# Patient Record
Sex: FEMALE | Race: BLACK OR AFRICAN AMERICAN | Employment: UNEMPLOYED | URBAN - METROPOLITAN AREA
[De-identification: names, ages, dates, MRNs, and addresses within clinical notes are randomized per-mention and may not be internally consistent; named-entity substitution may affect disease eponyms.]

---

## 2017-10-12 ENCOUNTER — HOSPITAL ENCOUNTER (OUTPATIENT)
Age: 22
Setting detail: SPECIMEN
Discharge: HOME | End: 2017-10-12
Payer: MEDICAID

## 2020-11-25 ENCOUNTER — HOSPITAL ENCOUNTER (OUTPATIENT)
Age: 25
Setting detail: SPECIMEN
Discharge: HOME | End: 2020-11-25
Payer: MEDICARE

## 2020-11-25 ENCOUNTER — OFFICE VISIT (OUTPATIENT)
Dept: OBGYN CLINIC | Age: 25
End: 2020-11-25
Payer: MEDICARE

## 2020-11-25 VITALS
DIASTOLIC BLOOD PRESSURE: 70 MMHG | WEIGHT: 149 LBS | BODY MASS INDEX: 29.25 KG/M2 | SYSTOLIC BLOOD PRESSURE: 114 MMHG | TEMPERATURE: 99.9 F | HEIGHT: 60 IN

## 2020-11-25 PROBLEM — Z87.59 HISTORY OF GESTATIONAL HYPERTENSION: Status: ACTIVE | Noted: 2020-11-25

## 2020-11-25 PROBLEM — Z87.59 HISTORY OF GESTATIONAL HYPERTENSION: Status: RESOLVED | Noted: 2020-11-25 | Resolved: 2020-11-25

## 2020-11-25 PROBLEM — E73.9 LACTOSE INTOLERANCE: Status: ACTIVE | Noted: 2017-10-31

## 2020-11-25 PROBLEM — O28.3 ABNORMAL FETAL ULTRASOUND: Status: RESOLVED | Noted: 2017-10-31 | Resolved: 2020-11-25

## 2020-11-25 PROBLEM — O13.9 GESTATIONAL HYPERTENSION: Status: ACTIVE | Noted: 2018-04-26

## 2020-11-25 PROBLEM — O13.9 GESTATIONAL HYPERTENSION: Status: RESOLVED | Noted: 2018-04-26 | Resolved: 2020-11-25

## 2020-11-25 PROBLEM — O28.3 ABNORMAL FETAL ULTRASOUND: Status: ACTIVE | Noted: 2017-10-31

## 2020-11-25 LAB
DIRECT EXAM: NORMAL
Lab: NORMAL
SPECIMEN DESCRIPTION: NORMAL

## 2020-11-25 PROCEDURE — G8484 FLU IMMUNIZE NO ADMIN: HCPCS | Performed by: NURSE PRACTITIONER

## 2020-11-25 PROCEDURE — G8419 CALC BMI OUT NRM PARAM NOF/U: HCPCS | Performed by: NURSE PRACTITIONER

## 2020-11-25 PROCEDURE — 87510 GARDNER VAG DNA DIR PROBE: CPT

## 2020-11-25 PROCEDURE — 87480 CANDIDA DNA DIR PROBE: CPT

## 2020-11-25 PROCEDURE — G8427 DOCREV CUR MEDS BY ELIG CLIN: HCPCS | Performed by: NURSE PRACTITIONER

## 2020-11-25 PROCEDURE — 87660 TRICHOMONAS VAGIN DIR PROBE: CPT

## 2020-11-25 PROCEDURE — 4004F PT TOBACCO SCREEN RCVD TLK: CPT | Performed by: NURSE PRACTITIONER

## 2020-11-25 PROCEDURE — 87491 CHLMYD TRACH DNA AMP PROBE: CPT

## 2020-11-25 PROCEDURE — 87591 N.GONORRHOEAE DNA AMP PROB: CPT

## 2020-11-25 PROCEDURE — 99213 OFFICE O/P EST LOW 20 MIN: CPT | Performed by: NURSE PRACTITIONER

## 2020-11-25 NOTE — PROGRESS NOTES
Jules Kenney  2020    YOB: 1995          The patient was seen today. She is here regarding right labial swollen 10 days ago, resolved now. C/o vaginal irritation, discharge, itching about a week ago- also resolved. Her bowels are regular and she is voiding without difficulty. HPI:  Jules Kenney is a 31 y.o. female  right labial swelling, vaginal irritation       OB History    Para Term  AB Living   1 1 1 0 0 1   SAB TAB Ectopic Molar Multiple Live Births   0 0 0 0 0 1      # Outcome Date GA Lbr Wilber/2nd Weight Sex Delivery Anes PTL Lv   1 Term 18 38w0d / 01:00 5 lb 13.7 oz (2.655 kg) M Vag-Spont  N JEANNETTE      Name: Monica Limber: 9  Apgar5: 9       History reviewed. No pertinent past medical history. History reviewed. No pertinent surgical history. History reviewed. No pertinent family history. Social History     Socioeconomic History    Marital status: Single     Spouse name: Not on file    Number of children: Not on file    Years of education: Not on file    Highest education level: Not on file   Occupational History    Not on file   Social Needs    Financial resource strain: Not on file    Food insecurity     Worry: Not on file     Inability: Not on file    Transportation needs     Medical: Not on file     Non-medical: Not on file   Tobacco Use    Smoking status: Current Some Day Smoker    Smokeless tobacco: Never Used   Substance and Sexual Activity    Alcohol use:  Yes    Drug use: Never    Sexual activity: Yes     Partners: Male     Birth control/protection: Condom   Lifestyle    Physical activity     Days per week: Not on file     Minutes per session: Not on file    Stress: Not on file   Relationships    Social connections     Talks on phone: Not on file     Gets together: Not on file     Attends Judaism service: Not on file     Active member of club or organization: Not on file     Attends meetings of clubs or organizations: Not on file     Relationship status: Not on file    Intimate partner violence     Fear of current or ex partner: Not on file     Emotionally abused: Not on file     Physically abused: Not on file     Forced sexual activity: Not on file   Other Topics Concern    Not on file   Social History Narrative    Not on file         MEDICATIONS:  No current outpatient medications on file. No current facility-administered medications for this visit. ALLERGIES:  Allergies as of 11/25/2020    (No Known Allergies)         REVIEW OF SYSTEMS:    yes   A minimum of an eleven point review of systems was completed. Review Of Systems (11 point):  Constitutional: No fever, chills or malaise; No weight change or fatigue  Head and Eyes: No vision, Headache, Dizziness or trauma in last 12 months  ENT ROS: No hearing, Tinnitis, sinus or taste problems  Hematological and Lymphatic ROS:No Lymphoma, Von Willebrand's, Hemophillia or Bleeding History  Psych ROS: No Depression, Homicidal thoughts,suicidal thoughts, or anxiety  Breast ROS: No prior breast abnormalities or lumps  Respiratory ROS: No SOB, Pneumoniae,Cough, or Pulmonary Embolism History  Cardiovascular ROS: No Chest Pain with Exertion, Palpitations, Syncope, Edema, Arrhythmia  Gastrointestinal ROS: No Indigestion, Heartburn, Nausea, vomiting, Diarrhea, Constipation,or Bowel Changes; No Bloody Stools or melena  Genito-Urinary ROS: No Dysuria, Hematuria or Nocturia. No Urinary Incontinence or Vaginal Discharge  Musculoskeletal ROS: No Arthralgia, Arthritis,Gout,Osteoporosis or Rheumatism  Neurological ROS: No CVA, Migraines, Epilepsy, Seizure Hx, or Limb Weakness  Dermatological ROS: No Rash, Itching, Hives, Mole Changes or Cancer          Blood pressure 114/70, temperature 99.9 °F (37.7 °C), height 5' (1.524 m), weight 149 lb (67.6 kg), last menstrual period 10/01/2020, not currently breastfeeding.     Chaperone for Intimate Exam   Chaperone was offered and accepted as part of the rooming process.  Chaperone: Monica BLEDSOE         Abdomen: Soft non-tender; good bowel sounds. No guarding, rebound or rigidity. No CVA tenderness bilaterally. Extremities: No calf tenderness, DTR 2/4, and No edema bilaterally    Pelvic: Vulva and vagina appear normal. Bimanual exam reveals normal uterus and adnexa. No labial swelling noted. No redness, discharge noted. Diagnostics:  No results found. Lab Results:  No results found for this or any previous visit. Assessment:   Diagnosis Orders   1. Acute vaginitis  VAGINITIS DNA PROBE    C.trachomatis N.gonorrhoeae DNA   2. History of gestational hypertension       Patient Active Problem List    Diagnosis Date Noted    History of gestational hypertension 11/25/2020    Lactose intolerance 10/31/2017         PLAN:  Return in about 4 weeks (around 12/23/2020) for annual.  Vaginal cultures collected  Repeat Annual every 1 year  Cervical Cytology Evaluation begins at 24years old. If Negative Cytology, Follow-up screening per current guidelines. Return to the office in 4 weeks. Counseled on preventative health maintenance follow-up. Orders Placed This Encounter   Procedures    VAGINITIS DNA PROBE     Standing Status:   Future     Standing Expiration Date:   11/25/2021    C.trachomatis N.gonorrhoeae DNA     Standing Status:   Future     Standing Expiration Date:   11/25/2021       Patient was seen with total face to face time of 15 minutes. More than 50% of this visit was counseling and education regarding The primary encounter diagnosis was Acute vaginitis. A diagnosis of History of gestational hypertension was also pertinent to this visit. and Established New Doctor and Vaginal Discharge   as well as  counseling on preventative health maintenance follow-up.

## 2020-11-27 LAB
C TRACH DNA GENITAL QL NAA+PROBE: NEGATIVE
N. GONORRHOEAE DNA: NEGATIVE
SPECIMEN DESCRIPTION: NORMAL

## 2021-08-16 ENCOUNTER — OFFICE VISIT (OUTPATIENT)
Dept: OBGYN CLINIC | Age: 26
End: 2021-08-16
Payer: MEDICARE

## 2021-08-16 ENCOUNTER — HOSPITAL ENCOUNTER (OUTPATIENT)
Age: 26
Setting detail: SPECIMEN
Discharge: HOME | End: 2021-08-16
Payer: MEDICARE

## 2021-08-16 VITALS
HEIGHT: 60 IN | WEIGHT: 151 LBS | DIASTOLIC BLOOD PRESSURE: 70 MMHG | BODY MASS INDEX: 29.64 KG/M2 | SYSTOLIC BLOOD PRESSURE: 114 MMHG

## 2021-08-16 DIAGNOSIS — N89.8 VAGINAL ODOR: ICD-10-CM

## 2021-08-16 DIAGNOSIS — N89.8 VAGINAL DISCHARGE: Primary | ICD-10-CM

## 2021-08-16 DIAGNOSIS — R30.0 DYSURIA: ICD-10-CM

## 2021-08-16 PROCEDURE — 4004F PT TOBACCO SCREEN RCVD TLK: CPT | Performed by: NURSE PRACTITIONER

## 2021-08-16 PROCEDURE — G8428 CUR MEDS NOT DOCUMENT: HCPCS | Performed by: NURSE PRACTITIONER

## 2021-08-16 PROCEDURE — 99213 OFFICE O/P EST LOW 20 MIN: CPT | Performed by: NURSE PRACTITIONER

## 2021-08-16 PROCEDURE — G8419 CALC BMI OUT NRM PARAM NOF/U: HCPCS | Performed by: NURSE PRACTITIONER

## 2021-08-16 NOTE — PROGRESS NOTES
Bairon Quezada  2021    YOB: 1995          The patient was seen today. She is here regarding vaginal discharge/odor/ burning x 3-4 weeks. Denies new partner . Her bowels are regular and she is voiding without difficulty. HPI:  Bairon Quezada is a 31 y.o. female  vaginal discharge/ odor/itching      OB History    Para Term  AB Living   1 1 1 0 0 1   SAB TAB Ectopic Molar Multiple Live Births   0 0 0 0 0 1      # Outcome Date GA Lbr Wilber/2nd Weight Sex Delivery Anes PTL Lv   1 Term 18 38w0d / 01:00 5 lb 13.7 oz (2.655 kg) M Vag-Spont  N JEANNETTE      Name: Selina Ovalles: 9  Apgar5: 9       History reviewed. No pertinent past medical history. History reviewed. No pertinent surgical history. History reviewed. No pertinent family history. Social History     Socioeconomic History    Marital status: Single     Spouse name: Not on file    Number of children: Not on file    Years of education: Not on file    Highest education level: Not on file   Occupational History    Not on file   Tobacco Use    Smoking status: Current Some Day Smoker    Smokeless tobacco: Never Used   Substance and Sexual Activity    Alcohol use: Yes    Drug use: Never    Sexual activity: Yes     Partners: Male     Birth control/protection: Condom   Other Topics Concern    Not on file   Social History Narrative    Not on file     Social Determinants of Health     Financial Resource Strain:     Difficulty of Paying Living Expenses:    Food Insecurity:     Worried About Running Out of Food in the Last Year:     [address] in the Last Year:    Transportation Needs:     Lack of Transportation (Medical):      Lack of Transportation (Non-Medical):    Physical Activity:     Days of Exercise per Week:     Minutes of Exercise per Session:    Stress:     Feeling of Stress :    Social Connections:     Frequency of Communication with Friends and Family:     Frequency of Social Gatherings with Friends and Family:     Attends [employer]ist Services:     Active Member of Clubs or Organizations:     Attends Club or Organization Meetings:     Marital Status:    Intimate Partner Violence:     Fear of Current or Ex-Partner:     Emotionally Abused:     Physically Abused:     Sexually Abused:          MEDICATIONS:  No current outpatient medications on file. No current facility-administered medications for this visit. ALLERGIES:  Allergies as of 08/16/2021    (No Known Allergies)         REVIEW OF SYSTEMS:    yes   A minimum of an eleven point review of systems was completed. Review Of Systems (11 point):  Constitutional: No fever, chills or malaise; No weight change or fatigue  Head and Eyes: No vision, Headache, Dizziness or trauma in last 12 months  ENT ROS: No hearing, Tinnitis, sinus or taste problems  Hematological and Lymphatic ROS:No Lymphoma, Von Willebrand's, Hemophillia or Bleeding History  Psych ROS: No Depression, Homicidal thoughts,suicidal thoughts, or anxiety  Breast ROS: No prior breast abnormalities or lumps  Respiratory ROS: No SOB, Pneumoniae,Cough, or Pulmonary Embolism History  Cardiovascular ROS: No Chest Pain with Exertion, Palpitations, Syncope, Edema, Arrhythmia  Gastrointestinal ROS: No Indigestion, Heartburn, Nausea, vomiting, Diarrhea, Constipation,or Bowel Changes; No Bloody Stools or melena  Genito-Urinary ROS: No Dysuria, Hematuria or Nocturia. No Urinary Incontinence or Vaginal Discharge  Musculoskeletal ROS: No Arthralgia, Arthritis,Gout,Osteoporosis or Rheumatism  Neurological ROS: No CVA, Migraines, Epilepsy, Seizure Hx, or Limb Weakness  Dermatological ROS: No Rash, Itching, Hives, Mole Changes or Cancer          Blood pressure 114/70, height 5' (1.524 m), weight 151 lb (68.5 kg), last menstrual period 08/08/2021, not currently breastfeeding.     Chaperone for Intimate Exam   Chaperone was offered and accepted as part of the rooming process.  Chaperone: Monica MA         Abdomen: Soft non-tender; good bowel sounds. No guarding, rebound or rigidity. No CVA tenderness bilaterally. Extremities: No calf tenderness, DTR 2/4, and No edema bilaterally    Pelvic: Vulva and vagina appear normal. Bimanual exam reveals normal uterus and adnexa. Diagnostics:  No results found. Lab Results:  Results for orders placed or performed during the hospital encounter of 11/25/20   C.trachomatis N.gonorrhoeae DNA    Specimen: Cervix   Result Value Ref Range    Specimen Description . CERVIX     C. trachomatis DNA NEGATIVE NEGATIVE    N. gonorrhoeae DNA NEGATIVE NEGATIVE   VAGINITIS DNA PROBE    Specimen: Vaginal   Result Value Ref Range    Specimen Description . VAGINA     Special Requests NOT REPORTED     Direct Exam NEGATIVE for Gardnerella vaginalis     Direct Exam NEGATIVE for Trichomonas vaginalis     Direct Exam NEGATIVE for Candida sp. Direct Exam       Method of testing is a DNA probe intended for detection and identification of Candida species, Gardnerella vaginalis, and Trichomonas vaginalis nucleic acid in vaginal fluid specimens from patients with symptoms of vaginitis/vaginosis. Assessment:   Diagnosis Orders   1. Vaginal discharge  VAGINITIS DNA PROBE    C.trachomatis N.gonorrhoeae DNA   2. Vaginal odor  VAGINITIS DNA PROBE    C.trachomatis N.gonorrhoeae DNA    Urinalysis Reflex to Culture   3. Dysuria  Urinalysis Reflex to Culture     Patient Active Problem List    Diagnosis Date Noted    History of gestational hypertension 11/25/2020    Lactose intolerance 10/31/2017           PLAN:  Return in about 4 weeks (around 9/13/2021) for annual.  Vaginal cultures collected  Slip for UA given   Repeat Annual every 1 year  Cervical Cytology Evaluation begins at 24years old. If Negative Cytology, Follow-up screening per current guidelines. Return to the office in 4 weeks.   Counseled on preventative health maintenance follow-up. Orders Placed This Encounter   Procedures    VAGINITIS DNA PROBE     Standing Status:   Future     Standing Expiration Date:   8/16/2022    C.trachomatis N.gonorrhoeae DNA     Standing Status:   Future     Standing Expiration Date:   8/16/2022    Urinalysis Reflex to Culture     Standing Status:   Future     Standing Expiration Date:   8/16/2022     Order Specific Question:   SPECIFY(EX-CATH,MIDSTREAM,CYSTO,ETC)? Answer:   clean catch           The patient, Kindra Kelly is a 31 y.o. female, was seen with a total time spent of 15 minutes for the visit on this date of service by the E/M provider. The time component had both face to face and non face to face time spent in determining the total time component. Counseling and education regarding her diagnosis listed below and her options regarding those diagnoses were also included in determining her time component. Diagnosis Orders   1. Vaginal discharge  VAGINITIS DNA PROBE    C.trachomatis N.gonorrhoeae DNA   2. Vaginal odor  VAGINITIS DNA PROBE    C.trachomatis N.gonorrhoeae DNA    Urinalysis Reflex to Culture   3. Dysuria  Urinalysis Reflex to Culture        The patient had her preventative health maintenance recommendations and follow-up reviewed with her at the completion of her visit.

## 2021-08-17 DIAGNOSIS — N89.8 VAGINAL ODOR: ICD-10-CM

## 2021-08-17 DIAGNOSIS — N89.8 VAGINAL DISCHARGE: ICD-10-CM

## 2021-08-17 LAB
C TRACH DNA GENITAL QL NAA+PROBE: NEGATIVE
DIRECT EXAM: NORMAL
Lab: NORMAL
N. GONORRHOEAE DNA: NEGATIVE
SPECIMEN DESCRIPTION: NORMAL
SPECIMEN DESCRIPTION: NORMAL

## 2021-08-20 ENCOUNTER — TELEPHONE (OUTPATIENT)
Dept: OBGYN CLINIC | Age: 26
End: 2021-08-20

## 2021-08-20 NOTE — TELEPHONE ENCOUNTER
Patient was notified of normal culture results. Encouraged patient to complete urinalysis to rule out +UTI.

## 2022-01-14 ENCOUNTER — HOSPITAL ENCOUNTER (OUTPATIENT)
Age: 27
Setting detail: SPECIMEN
Discharge: HOME | End: 2022-01-14

## 2022-01-14 ENCOUNTER — OFFICE VISIT (OUTPATIENT)
Dept: OBGYN CLINIC | Age: 27
End: 2022-01-14
Payer: MEDICARE

## 2022-01-14 VITALS
SYSTOLIC BLOOD PRESSURE: 122 MMHG | HEIGHT: 60 IN | WEIGHT: 162 LBS | BODY MASS INDEX: 31.8 KG/M2 | DIASTOLIC BLOOD PRESSURE: 82 MMHG

## 2022-01-14 DIAGNOSIS — A63.0 GENITAL WARTS: ICD-10-CM

## 2022-01-14 DIAGNOSIS — N76.0 ACUTE VAGINITIS: ICD-10-CM

## 2022-01-14 DIAGNOSIS — Z01.419 ENCOUNTER FOR GYNECOLOGICAL EXAMINATION WITHOUT ABNORMAL FINDING: Primary | ICD-10-CM

## 2022-01-14 LAB
CANDIDA SPECIES, DNA PROBE: NEGATIVE
GARDNERELLA VAGINALIS, DNA PROBE: NEGATIVE
SOURCE: NORMAL
TRICHOMONAS VAGINALIS DNA: NEGATIVE

## 2022-01-14 PROCEDURE — G8484 FLU IMMUNIZE NO ADMIN: HCPCS | Performed by: NURSE PRACTITIONER

## 2022-01-14 PROCEDURE — 99395 PREV VISIT EST AGE 18-39: CPT | Performed by: NURSE PRACTITIONER

## 2022-01-14 NOTE — PROGRESS NOTES
History and Physical  [address], [address] [facility] [address]. [phone]   Fax [phone]  Bree Darnell  2022              31 y.o. Chief Complaint   Patient presents with    Gynecologic Exam       Patient's last menstrual period was 12/15/2021 (approximate). Primary Care Physician: No primary care provider on file. The patient was seen and examined. She is here for her annual exam. Vaginal irritation 2 weeks ago. Denies sx currently. Her bowels are regular. There are no voiding complaints. She denies any bloating. She denies vaginal discharge and was counseled on STD's and the need for barrier contraception. HPI : Bree Darnell is a 31 y.o. female     Annual exam   Vaginal irritation     no Bloating  no Early Satiety  no Unexplained weight change of more than 15 lbs  no  PMB  no  PCB  ________________________________________________________________________  OB History    Para Term  AB Living   1 1 1 0 0 1   SAB IAB Ectopic Molar Multiple Live Births   0 0 0 0 0 1      # Outcome Date GA Lbr Wilber/2nd Weight Sex Delivery Anes PTL Lv   1 Term 18 38w0d / 01:00 5 lb 13.7 oz (2.655 kg) M Vag-Spont  N JEANNETTE      Name: Rebeca Robchidi: 9  Apgar5: 9     History reviewed. No pertinent past medical history. History reviewed. No pertinent surgical history. History reviewed. No pertinent family history. Social History     Socioeconomic History    Marital status: Single     Spouse name: Not on file    Number of children: Not on file    Years of education: Not on file    Highest education level: Not on file   Occupational History    Not on file   Tobacco Use    Smoking status: Former Smoker    Smokeless tobacco: Never Used   Substance and Sexual Activity    Alcohol use:  Yes    Drug use: Never    Sexual activity: Yes     Partners: Male Birth control/protection: Condom   Other Topics Concern    Not on file   Social History Narrative    Not on file     Social Determinants of Health     Financial Resource Strain:     Difficulty of Paying Living Expenses: Not on file   Food Insecurity:     Worried About Running Out of Food in the Last Year: Not on file    Wen of Food in the Last Year: Not on file   Transportation Needs:     Lack of Transportation (Medical): Not on file    Lack of Transportation (Non-Medical): Not on file   Physical Activity:     Days of Exercise per Week: Not on file    Minutes of Exercise per Session: Not on file   Stress:     Feeling of Stress : Not on file   Social Connections:     Frequency of Communication with Friends and Family: Not on file    Frequency of Social Gatherings with Friends and Family: Not on file    Attends [employer] Services: Not on file    Active Member of [address] [place] [address] [employer] or Organizations: Not on file    Attends Club or Organization Meetings: Not on file    Marital Status: Not on file   Intimate Partner Violence:     Fear of Current or Ex-Partner: Not on file    Emotionally Abused: Not on file    Physically Abused: Not on file    Sexually Abused: Not on file   Housing Stability:     Unable to Pay for Housing in the Last Year: Not on file    Number of [place] in the Last Year: Not on file    Unstable Housing in the Last Year: Not on file       MEDICATIONS:  No current outpatient medications on file. No current facility-administered medications for this visit. ALLERGIES:  Allergies as of 01/14/2022    (No Known Allergies)       Symptoms of decreased mood absent  Symptoms of anhedonia absent    **If either question is answered in a  positive fashion then complete the PHQ9 Scoring Evaluation and make the appropriate referral**      Immunization status: stated as current, but no records available.       Gynecologic History:  Menarche: 14 yo  Menopause at [age] yo     Patient's last menstrual period was 12/15/2021 (approximate). Sexually Active: Yes    STD History: Yes genital warts      Permanent Sterilization: No   Reversible Birth Control: No        Hormone Replacement Exposure: No      Genetic Qualified Family History of Breast, Ovarian , Colon or Uterine Cancer: No     If YES see scanned worksheet. Preventative Health Testing:    Health Maintenance:  Health Maintenance Due   Topic Date Due    Hepatitis C screen  Never done    Varicella vaccine (1 of 2 - 2-dose childhood series) Never done    COVID-19 Vaccine (1) Never done    Pneumococcal 0-64 years Vaccine (1 of 2 - PPSV23) Never done    Depression Screen  Never done    HIV screen  Never done    DTaP/Tdap/Td vaccine (1 - Tdap) Never done    Pap smear  Never done    Flu vaccine (1) Never done       Date of Last Pap Smear: na  Abnormal Pap Smear History: na  Colposcopy History:   Date of Last Mammogram: na  Date of Last Colonoscopy:   Date of Last Bone Density:      ________________________________________________________________________        REVIEW OF SYSTEMS:    yes   A minimum of an eleven point review of systems was completed. Review Of Systems (11 point):  Constitutional: No fever, chills or malaise; No weight change or fatigue  Head and Eyes: No vision changes, Headache, Dizziness or trauma in last 12 months  ENT ROS: No hearing, Tinnitis, sinus or taste problems  Hematological and Lymphatic ROS:No Lymphoma, Von Willebrand's, Hemophillia or Bleeding History  Psych ROS: No Depression, Homicidal thoughts,suicidal thoughts, or anxiety  Breast ROS: No breast abnormalities or lumps  Respiratory ROS: No SOB, Pneumoniae,Cough, or Pulmonary Embolism   Cardiovascular ROS: No Chest Pain with Exertion, Palpitations, Syncope, Edema, Arrhythmia  Gastrointestinal ROS: No Indigestion, Heartburn, Nausea, vomiting, Diarrhea, Constipation,or Bowel Changes;  No Bloody Stools or melena  Genito-Urinary ROS: No Dysuria, Hematuria or Nocturia. No Urinary Incontinence or Vaginal Discharge  Musculoskeletal ROS: No Arthralgia, Arthritis,Gout,Osteoporosis or Rheumatism  Neurological ROS: No CVA, Migraines, Epilepsy, Seizure Hx, or Limb Weakness  Dermatological ROS: No Rash, Itching, Hives, Mole Changes or Cancer                                                                                                                                                                                                                                  PHYSICAL Exam:     Constitutional:  Vitals:    01/14/22 0932   BP: 122/82   Site: Left Upper Arm   Position: Sitting   Cuff Size: Medium Adult   Weight: 162 lb (73.5 kg)   Height: 5' (1.524 m)       Chaperone for Intimate Exam   Chaperone was offered and accepted as part of the rooming process.  Chaperone: Tory BLEDSOE          General Appearance: This  is a well Developed, well Nourished, well groomed female. Her BMI was reviewed. Nutritional decision making was discussed. Skin:  There was a Normal Inspection of the skin without rashes or lesions. There were no rashes. (Papular, Maculopapular, Hives, Pustular, Macular)     There were no lesions (Ulcers, Erythema, Abn. Appearing Nevi)            Lymphatic:  No Lymph Nodes were Palpable in the neck , axilla or groin.  0 # Of Lymph Nodes; Location ; Character [Normal]  [Shotty] [Tender] [Enlarged]     Neck and EENT:  The neck was supple. There were no masses   The thyroid was not enlarged and had no masses. Perrla, EOMI B/L, TMI B/L No Abnormalities. Throat inspected-No exudates or Masses, Nares Patent No Masses        Respiratory: The lungs were auscultated and found to be clear. There were no rales, rhonchi or wheezes. There was a good respiratory effort. Cardiovascular: The heart was in a regular rate and rhythm. . No S3 or S4. There was no murmur appreciated. Location, grade, and radiation are not applicable. Extremities:   The patients extremities were without calf tenderness, edema, or varicosities. There was full range of motion in all four extremities. Pulses in all four extremities were appreciated and are 2/4. Abdomen: The abdomen was soft and non-tender. There were good bowel sounds in all quadrants and there was no guarding, rebound or rigidity. On evaluation there was no evidence of hepatosplenomegaly and there was no costal vertebral nasreen tenderness bilaterally. No hernias were appreciated. Abdominal Scars: none    Psych: The patient had a normal Orientation to: Time, Place, Person, and Situation  There is no Mood / Affect changes    Breast:  (Chest)  normal appearance, no masses or tenderness  Self breast exams were reviewed in detail. Literature was given. Pelvic Exam:  External genitalia: normal general appearance  Urinary system: urethral meatus normal  Vaginal: normal mucosa without prolapse or lesions  Cervix: normal appearance  Adnexa: normal bimanual exam  Uterus: normal single, nontender      Rectal Exam:  exam declined by patient          Musculosk:  Normal Gait and station was noted. Digits were evaluated without abnormal findings. Range of motion, stability and strength were evaluated and found to be appropriate for the patients age. ASSESSMENT:      31 y.o. Annual   Diagnosis Orders   1. Encounter for gynecological examination without abnormal finding  PAP SMEAR   2. Acute vaginitis  VAGINITIS DNA PROBE    C.trachomatis N.gonorrhoeae DNA   3. Genital warts            Chief Complaint   Patient presents with    Gynecologic Exam          History reviewed. No pertinent past medical history. Patient Active Problem List    Diagnosis Date Noted    Genital warts 01/14/2022     Priority: High    History of gestational hypertension 11/25/2020    Lactose intolerance 10/31/2017          Hereditary Breast, Ovarian, Colon and Uterine Cancer screening Done.           Tobacco & Secondary smoke risks reviewed; instructed on cessation and avoidance      Counseling Completed:  Preventative Health Recommendations and Follow up. The patient was informed of the recommended preventative health recommendations. 1. Annuals every year; Cytology collections per prevailing guidelines. 2. Mammograms begin every year at 37 yo if no abnormalities are found and no family history. 3. Bone density studies every 2-3 years. Begin at 73 yo. If no fracture history or osteoporosis family history. (significant). 4. Colonoscopy begin at 40 yo. Repeat every ten years if negative and no family history. 5. Calcium of 8093-0483 mg/day in split dosing  6. Vitamin D 400-800 IU/day  7. All other preventative health recommendations will be managed by the patients Primary care physician. PLAN:  Return in about 1 year (around 1/14/2023) for annual.   Pap smear collected  Vaginal cultures collected   Repeat Annual every 1 year  Cervical Cytology Evaluation begins at 24years old. If Negative Cytology, Follow-up screening per current guidelines. Mammograms every 1 year. If 37 yo and last mammogram was negative. Calcium and Vitamin D dosing reviewed. Colonoscopy screening reviewed as well as onset for bone density testing. Birth control and barrier recommendations discussed. STD counseling and prevention reviewed. Gardisil counseling completed for all patients 10-37 yo. Routine health maintenance per patients PCP. Orders Placed This Encounter   Procedures    VAGINITIS DNA PROBE     Standing Status:   Future     Standing Expiration Date:   1/13/2023    C.trachomatis N.gonorrhoeae DNA     Standing Status:   Future     Standing Expiration Date:   1/13/2023    PAP SMEAR     Patient History:    No LMP recorded. OBGYN Status: Having periods  No past surgical history on file.       Social History    Tobacco Use      Smoking status: Current Some Day Smoker      Smokeless tobacco: Never Used       Standing Status:   Future Standing Expiration Date:   1/13/2023     Order Specific Question:   Collection Type     Answer: Thin Prep     Order Specific Question:   Prior Abnormal Pap Test     Answer:   No     Order Specific Question:   Screening or Diagnostic     Answer:   Screening     Order Specific Question:   HPV Requested? Answer:   Yes - If Abnormal Reflex HPV     Order Specific Question:   High Risk Patient     Answer:   N/A           The patient, Ct Deluna is a 31 y.o. female, was seen with a total time spent of 30 minutes for the visit on this date of service by the E/M provider. The time component had both face to face and non face to face time spent in determining the total time component. Counseling and education regarding her diagnosis listed below and her options regarding those diagnoses were also included in determining her time component. Diagnosis Orders   1. Encounter for gynecological examination without abnormal finding  PAP SMEAR   2. Acute vaginitis  VAGINITIS DNA PROBE    C.trachomatis N.gonorrhoeae DNA   3. Genital warts          The patient had her preventative health maintenance recommendations and follow-up reviewed with her at the completion of her visit.

## 2022-01-25 LAB — CYTOLOGY REPORT: NORMAL

## 2022-05-16 DIAGNOSIS — N92.6 MISSED PERIOD: Primary | ICD-10-CM

## 2022-05-24 ENCOUNTER — HOSPITAL ENCOUNTER (OUTPATIENT)
Age: 27
Setting detail: SPECIMEN
Discharge: HOME | End: 2022-05-24
Payer: MEDICARE

## 2022-05-24 DIAGNOSIS — N92.6 MISSED PERIOD: ICD-10-CM

## 2022-05-24 LAB — HCG QUANTITATIVE: ABNORMAL MIU/ML

## 2022-05-24 PROCEDURE — 84702 CHORIONIC GONADOTROPIN TEST: CPT

## 2022-05-24 PROCEDURE — 36415 COLL VENOUS BLD VENIPUNCTURE: CPT

## 2022-05-25 ENCOUNTER — TELEPHONE (OUTPATIENT)
Dept: OBGYN CLINIC | Age: 27
End: 2022-05-25

## 2022-05-25 NOTE — TELEPHONE ENCOUNTER
Per Kim De La Torre NP, pt notified of +quant; LMP 2/3/22. Pt prefers OTC gummies for PNV; she will purchase. Call sent to [facility] to schedule new OB intake/US.

## 2022-05-25 NOTE — TELEPHONE ENCOUNTER
----- Message from BISMARK Teague CNP sent at 5/25/2022  7:48 AM EDT -----  Please schedule for new OB intake/ ultrasound  Prenatal vitamin 1 PO QD with 12 refills.

## 2022-06-28 ENCOUNTER — PROCEDURE VISIT (OUTPATIENT)
Dept: OBGYN CLINIC | Age: 27
End: 2022-06-28
Payer: MEDICARE

## 2022-06-28 ENCOUNTER — ROUTINE PRENATAL (OUTPATIENT)
Dept: OBGYN CLINIC | Age: 27
End: 2022-06-28
Payer: MEDICARE

## 2022-06-28 ENCOUNTER — HOSPITAL ENCOUNTER (OUTPATIENT)
Age: 27
Setting detail: SPECIMEN
Discharge: HOME | End: 2022-06-28

## 2022-06-28 ENCOUNTER — HOSPITAL ENCOUNTER (OUTPATIENT)
Age: 27
Setting detail: SPECIMEN
Discharge: HOME | End: 2022-06-28
Payer: MEDICARE

## 2022-06-28 VITALS
BODY MASS INDEX: 31.05 KG/M2 | DIASTOLIC BLOOD PRESSURE: 72 MMHG | WEIGHT: 159 LBS | SYSTOLIC BLOOD PRESSURE: 114 MMHG | HEART RATE: 80 BPM

## 2022-06-28 DIAGNOSIS — O09.30 LATE PRENATAL CARE: Primary | ICD-10-CM

## 2022-06-28 DIAGNOSIS — O09.92 SUPERVISION OF HIGH RISK PREGNANCY IN SECOND TRIMESTER: ICD-10-CM

## 2022-06-28 DIAGNOSIS — O09.32 LATE PRENATAL CARE AFFECTING PREGNANCY IN SECOND TRIMESTER: ICD-10-CM

## 2022-06-28 DIAGNOSIS — Z3A.20 20 WEEKS GESTATION OF PREGNANCY: ICD-10-CM

## 2022-06-28 DIAGNOSIS — Z3A.19 19 WEEKS GESTATION OF PREGNANCY: Primary | ICD-10-CM

## 2022-06-28 DIAGNOSIS — O09.30 LATE PRENATAL CARE: ICD-10-CM

## 2022-06-28 DIAGNOSIS — Z87.59 HISTORY OF GESTATIONAL HYPERTENSION: ICD-10-CM

## 2022-06-28 DIAGNOSIS — Z3A.19 19 WEEKS GESTATION OF PREGNANCY: ICD-10-CM

## 2022-06-28 DIAGNOSIS — O36.80X0 PREGNANCY WITH INCONCLUSIVE FETAL VIABILITY, SINGLE OR UNSPECIFIED FETUS: Primary | ICD-10-CM

## 2022-06-28 DIAGNOSIS — Z87.59 H/O PRE-ECLAMPSIA: ICD-10-CM

## 2022-06-28 LAB
ABDOMINAL CIRCUMFERENCE: NORMAL
ABO/RH: NORMAL
ABSOLUTE EOS #: 0.2 K/UL (ref 0–0.4)
ABSOLUTE LYMPH #: 2.2 K/UL (ref 1–4.8)
ABSOLUTE MONO #: 0.7 K/UL (ref 0.1–1.3)
ANTIBODY SCREEN: NEGATIVE
BASOPHILS # BLD: 0 % (ref 0–2)
BASOPHILS ABSOLUTE: 0 K/UL (ref 0–0.2)
BIPARIETAL DIAMETER: NORMAL
CRL: NORMAL
EOSINOPHILS RELATIVE PERCENT: 2 % (ref 0–4)
ESTIMATED FETAL WEIGHT: NORMAL
FEMORAL DIAMETER: NORMAL
GLUCOSE BLD-MCNC: 85 MG/DL (ref 70–99)
HC/AC: NORMAL
HCT VFR BLD CALC: 34.8 % (ref 36–46)
HEAD CIRCUMFERENCE: NORMAL
HEMOGLOBIN: 11.2 G/DL (ref 12–16)
HEPATITIS B SURFACE ANTIGEN: NONREACTIVE
HIV AG/AB: NONREACTIVE
LYMPHOCYTES # BLD: 20 % (ref 24–44)
MCH RBC QN AUTO: 26.8 PG (ref 26–34)
MCHC RBC AUTO-ENTMCNC: 32.3 G/DL (ref 31–37)
MCV RBC AUTO: 82.9 FL (ref 80–100)
MONOCYTES # BLD: 7 % (ref 1–7)
PDW BLD-RTO: 15.2 % (ref 11.5–14.9)
PLATELET # BLD: 325 K/UL (ref 150–450)
PMV BLD AUTO: 8.1 FL (ref 6–12)
RBC # BLD: 4.19 M/UL (ref 4–5.2)
RUBV IGG SER QL: 418.5 IU/ML
SAC DIAMETER: NORMAL
SEG NEUTROPHILS: 71 % (ref 36–66)
SEGMENTED NEUTROPHILS ABSOLUTE COUNT: 8.2 K/UL (ref 1.3–9.1)
T. PALLIDUM, IGG: NONREACTIVE
TSH SERPL DL<=0.05 MIU/L-ACNC: 0.54 UIU/ML (ref 0.3–5)
WBC # BLD: 11.4 K/UL (ref 3.5–11)

## 2022-06-28 PROCEDURE — 87389 HIV-1 AG W/HIV-1&-2 AB AG IA: CPT

## 2022-06-28 PROCEDURE — 1036F TOBACCO NON-USER: CPT | Performed by: NURSE PRACTITIONER

## 2022-06-28 PROCEDURE — 85025 COMPLETE CBC W/AUTO DIFF WBC: CPT

## 2022-06-28 PROCEDURE — 86901 BLOOD TYPING SEROLOGIC RH(D): CPT

## 2022-06-28 PROCEDURE — 81220 CFTR GENE COM VARIANTS: CPT

## 2022-06-28 PROCEDURE — 86777 TOXOPLASMA ANTIBODY: CPT

## 2022-06-28 PROCEDURE — 86850 RBC ANTIBODY SCREEN: CPT

## 2022-06-28 PROCEDURE — 99214 OFFICE O/P EST MOD 30 MIN: CPT | Performed by: NURSE PRACTITIONER

## 2022-06-28 PROCEDURE — 86900 BLOOD TYPING SEROLOGIC ABO: CPT

## 2022-06-28 PROCEDURE — G8428 CUR MEDS NOT DOCUMENT: HCPCS | Performed by: NURSE PRACTITIONER

## 2022-06-28 PROCEDURE — 86780 TREPONEMA PALLIDUM: CPT

## 2022-06-28 PROCEDURE — 86778 TOXOPLASMA ANTIBODY IGM: CPT

## 2022-06-28 PROCEDURE — 36415 COLL VENOUS BLD VENIPUNCTURE: CPT

## 2022-06-28 PROCEDURE — 84443 ASSAY THYROID STIM HORMONE: CPT

## 2022-06-28 PROCEDURE — 87340 HEPATITIS B SURFACE AG IA: CPT

## 2022-06-28 PROCEDURE — G8417 CALC BMI ABV UP PARAM F/U: HCPCS | Performed by: NURSE PRACTITIONER

## 2022-06-28 PROCEDURE — 86762 RUBELLA ANTIBODY: CPT

## 2022-06-28 PROCEDURE — 82947 ASSAY GLUCOSE BLOOD QUANT: CPT

## 2022-06-28 PROCEDURE — 76805 OB US >/= 14 WKS SNGL FETUS: CPT | Performed by: OBSTETRICS & GYNECOLOGY

## 2022-06-28 RX ORDER — ASPIRIN 81 MG/1
81 TABLET ORAL DAILY
Qty: 90 TABLET | Refills: 1 | Status: SHIPPED | OUTPATIENT
Start: 2022-06-28

## 2022-06-28 NOTE — PROGRESS NOTES
Haris Azar  2022    YOB: 1995   Patient's last menstrual period was 12/15/2021 (approximate). 19w6d  Joshua Maldonado      Primary Care Physician: No primary care provider on file. CC: Initial Prenatal Visit    Subjective:     Haris Azar is a 28 y.o. female     is being seen today for her first obstetrical visit. This is a planned pregnancy. She is at 19w6d  Her obstetrical history is significant for h/o preeclampsia, late prenatal care. Relationship with FOB: significant other, living together. Patient undecided about breast feeding. Pregnancy history fully reviewed. Mother's ethnicity:   Father's ethnicity:       Objective:   Blood pressure 114/72, pulse 80, weight 159 lb (72.1 kg), last menstrual period 12/15/2021, not currently breastfeeding. OB History    Para Term  AB Living   2 1 1 0 0 1   SAB IAB Ectopic Molar Multiple Live Births   0 0 0 0 0 1      # Outcome Date GA Lbr Wilber/2nd Weight Sex Delivery Anes PTL Lv   2 Current            1 Term 18 38w0d / 01:00 5 lb 13.7 oz (2.655 kg) M Vag-Spont  N JEANNETTE      Name: Bettye Martinez: 9  Apgar5: 9       No past medical history on file. No past surgical history on file. Social History     Socioeconomic History    Marital status: Single     Spouse name: Not on file    Number of children: Not on file    Years of education: Not on file    Highest education level: Not on file   Occupational History    Not on file   Tobacco Use    Smoking status: Former Smoker    Smokeless tobacco: Never Used   Substance and Sexual Activity    Alcohol use:  Yes    Drug use: Never    Sexual activity: Yes     Partners: Male     Birth control/protection: Condom   Other Topics Concern    Not on file   Social History Narrative    Not on file     Social Determinants of Health     Financial Resource Strain:     Difficulty of Paying Living Expenses: Not on file Food Insecurity:     Worried About Running Out of Food in the Last Year: Not on file    Wen of Food in the Last Year: Not on file   Transportation Needs:     Lack of Transportation (Medical): Not on file    Lack of Transportation (Non-Medical): Not on file   Physical Activity:     Days of Exercise per Week: Not on file    Minutes of Exercise per Session: Not on file   Stress:     Feeling of Stress : Not on file   Social Connections:     Frequency of Communication with Friends and Family: Not on file    Frequency of Social Gatherings with Friends and Family: Not on file    Attends [employer] Services: Not on file    Active Member of [address] [place] [address] [employer] or Organizations: Not on file    Attends Club or Organization Meetings: Not on file    Marital Status: Not on file   Intimate Partner Violence:     Fear of Current or Ex-Partner: Not on file    Emotionally Abused: Not on file    Physically Abused: Not on file    Sexually Abused: Not on file   Housing Stability:     Unable to Pay for Housing in the Last Year: Not on file    Number of [place] in the Last Year: Not on file    Unstable Housing in the Last Year: Not on file     No family history on file. MEDICATIONS:  Current Outpatient Medications   Medication Sig Dispense Refill    Prenatal MV & Min w/FA-DHA (PRENATAL GUMMIES) 0.18-25 MG CHEW Take 1 tablet by mouth daily      aspirin EC 81 MG EC tablet Take 1 tablet by mouth daily 90 tablet 1     No current facility-administered medications for this visit. ALLERGIES:  Allergies as of 06/28/2022    (No Known Allergies)           Physical Exam Completed-See Epic Navigator    Chaperone for Intimate Exam   Chaperone was offered and accepted as part of the rooming process.    Chaperone: Fiorella               Assessment:      Pregnancy at 23 and 6/7 weeks    Diagnosis Orders   1. 19 weeks gestation of pregnancy  Culture, Genital    C.trachomatis N.gonorrhoeae DNA    Rubella antibody, IgG    Prenatal type and screen    RPR    Cystic Fibrosis    Glucose, Random    CBC with Auto Differential    TSH with Reflex    Hepatitis B Surface Antigen Obstetric Panel    Ambulatory referral to Maternal Fetal    HIV Screen    Toxoplasma Gondii Antibody, IgG    Toxoplasma Gondii Antibody, IgM    Urinalysis with Reflex to Culture   2. Late prenatal care affecting pregnancy in second trimester  Culture, Genital    C.trachomatis N.gonorrhoeae DNA   3. Late prenatal care     4. H/O pre-eclampsia             Patient Active Problem List    Diagnosis Date Noted    Late prenatal care 2022     Priority: High     Started at 23 weeks      H/O pre-eclampsia 2022     Priority: High    Genital warts 2022     Priority: High    History of gestational hypertension 2020    Lactose intolerance 10/31/2017                    Plan:      Initial labs drawn. Prenatal vitamins. RTO in 4 weeks. Vaginal cultures collected. [facility] 20 week anatomy scan to be scheduled. Lab slips given. Problem list reviewed and updated. NT Screen/Quad Testing and MSAFP Single Marker: requested  Role of ultrasound in pregnancy discussed; fetal survey: requests  Amniocentesis discussed: Declined  NIPT Testing not indicated  Cultures & Wet Prep Collected  Follow-up in 4 weeks  Repeat Annual every 1 year  Cervical Cytology Evaluation begins at 24years old. If Negative Cytology, Follow-up screening per current guidelines. [facility] appointment scheduled      COUNSELING COMPLETED:  INITIAL OBSTETRICAL VISIT EVALUATION:  The patient was seen full history and physical was completed/reviewed. Cytology was collected for patients over 24years of age. Cultures were collected. The patient was counseled on office policies and she was counseled on termination of pregnancy in the state of [place].      The patient was counseled on Toxoplasmosis, HIV, Tobacco Abuse, Group Beta Strep Infections, Cystic Fibrosis,  Labor precautions and Sickle Cell disease. The patient was counseled on the risks of tobacco abuse. Both maternal and fetal. She was instructed to stop smoking if currently using tobacco. Morbidity, mortality, and cessation programs were reviewed. The risks include but are not limited to increased risks of  labor,  delivery, premature rupture of membranes, intrauterine growth restriction, intrauterine fetal demise and abruptio placenta. Secondary smoke risks were also reviewed. Increases in cancer, respiratory problems, and sudden infant death syndrome were reviewed as well. The patient was informed of a 2-4% risk of congenital anomalies in the general population. She was also informed that karyotyping is the only way to evaluate the fetus for genetic problems and genetic lethal anomalies. Chorionic villous sampling, amniocentesis and NIPT testing were also discussed with morbidity rates in detail. She declined the procedure. Route of delivery and counseling on vaginal, operative vaginal, and  sections were completed with the risks of each to both the patient as well as her baby. The possibility of a blood transfusion was discussed as well. The patient was not opposed to receiving a transfusion if needed. Nuchal translucency/Quad Evaluation and MSAFP single marker testing was reviewed in detail with attention to timing of testing and their windows. For patients beyond the gestational age for Nuchal translucency evaluation Quad testing was recommended. Timing for the Quad test was reviewed. Benefits of the above testing was reviewed. A second trimester amniocentesis was also made available to the patient. Risks, Benefits and non-invasive alternative testing was reviewed.      The literature regarding a questionable link to pitocin augmentation and induction of labor, the assistance of labor contractions and the initiation of contractions to help delivery, have been reviewed with the patient regarding the increased potential of having a  with Attention Deficit Hyperactivity Disorder and or Autism. These two disorders and the ramifications of their impact on a child and the family caring for that child has been reviewed with the patient in detail. She was given the risks, benefits and alternatives of the use of this medication. She has agreed to its use in the delivery of her unborn child if needed at the time of delivery, Yes. The patient was questioned in detail regarding any genetic misnomer history, chromosomal abnormalities, or learning disabilities in  herself, the father of the baby or their families. SHE DENIED ANY HISTORY AS STATED ABOVE: Yes    Upon completion of the visit all questions were answered and the patients follow-up and testing schedule were reviewed. Prenatal vitamins were given. T-dap Vaccine recommendations reviewed with the patient. Patient notified of timing of vaccination 27-36 weeks gestation. Patient aware Vaccine is NOT Live. Yes. The patient, King Catrina is a 28 y.o. female, was seen with a total time spent of 30 minutes for the visit on this date of service by the E/M provider. The time component had both face to face and non face to face time spent in determining the total time component. Counseling and education regarding her diagnosis listed below and her options regarding those diagnoses were also included in determining her time component. Diagnosis Orders   1. 19 weeks gestation of pregnancy  Culture, Genital    C.trachomatis N.gonorrhoeae DNA    Rubella antibody, IgG    Prenatal type and screen    RPR    Cystic Fibrosis    Glucose, Random    CBC with Auto Differential    TSH with Reflex    Hepatitis B Surface Antigen Obstetric Panel    Ambulatory referral to Maternal Fetal    HIV Screen    Toxoplasma Gondii Antibody, IgG    Toxoplasma Gondii Antibody, IgM    Urinalysis with Reflex to Culture   2.  Late prenatal care affecting pregnancy in second trimester Culture, Genital    C.trachomatis N.gonorrhoeae DNA   3. Late prenatal care     4. H/O pre-eclampsia          The patient had her preventative health maintenance recommendations and follow-up reviewed with her at the completion of her visit.

## 2022-06-28 NOTE — PROGRESS NOTES
Patient presents to office for OB intake, nurse visit and seeing Monica Wharton NP. Intake completed following ultrasound in office to confirm pregnancy dating/viability. Based on ultrasound, patient is currently 19w6d  gestation, Estimated Date of Delivery: 11/16/22. Patient presents to intake FOB. This was a planned pregnancy. Patient currently taking has a current medication list which includes the following prescription(s): prenatal gummies and aspirin ec. . Patient's medical, surgical, obstetrical and family history reviewed. See EHR for details. Patient prenatal lab work given to patient at this visit as well as OB education material. New OB consent forms signed. Patient declined first trimester screening due to dating. Cystic Fibrosis screening ordered. Referral placed to [facility] for late PNC/anatomy scan/NIPT and hx of gHTN/preeclampsia. Patient scheduled for follow up OB appointment with Deny Elliott NP on 7/27/22. Patient instructed to complete lab work prior to follow up OB appointment.

## 2022-06-29 LAB
C TRACH DNA GENITAL QL NAA+PROBE: NEGATIVE
N. GONORRHOEAE DNA: NEGATIVE
SPECIMEN DESCRIPTION: NORMAL
TOXOPLASM IGM: 0.26 INDEX
TOXOPLASMA BLOOD FOR RATIO: 2.3 IU/ML

## 2022-07-01 LAB
CULTURE: NORMAL
SPECIMEN DESCRIPTION: NORMAL

## 2022-07-15 LAB — CYSTIC FIBROSIS: NORMAL

## 2022-07-26 LAB
MISCELLANEOUS LAB TEST RESULT: NORMAL
TEST NAME: NORMAL

## 2022-08-04 ENCOUNTER — ROUTINE PRENATAL (OUTPATIENT)
Dept: OBGYN CLINIC | Age: 27
End: 2022-08-04
Payer: MEDICARE

## 2022-08-04 VITALS
DIASTOLIC BLOOD PRESSURE: 86 MMHG | BODY MASS INDEX: 33.4 KG/M2 | HEART RATE: 99 BPM | SYSTOLIC BLOOD PRESSURE: 126 MMHG | WEIGHT: 171 LBS

## 2022-08-04 DIAGNOSIS — Z87.59 HISTORY OF GESTATIONAL HYPERTENSION: ICD-10-CM

## 2022-08-04 DIAGNOSIS — O09.30 LATE PRENATAL CARE: ICD-10-CM

## 2022-08-04 DIAGNOSIS — O09.92 SUPERVISION OF HIGH RISK PREGNANCY IN SECOND TRIMESTER: ICD-10-CM

## 2022-08-04 DIAGNOSIS — Z3A.25 25 WEEKS GESTATION OF PREGNANCY: Primary | ICD-10-CM

## 2022-08-04 DIAGNOSIS — Z87.59 H/O PRE-ECLAMPSIA: ICD-10-CM

## 2022-08-04 PROCEDURE — 99213 OFFICE O/P EST LOW 20 MIN: CPT | Performed by: NURSE PRACTITIONER

## 2022-08-04 PROCEDURE — 1036F TOBACCO NON-USER: CPT | Performed by: NURSE PRACTITIONER

## 2022-08-04 PROCEDURE — G8427 DOCREV CUR MEDS BY ELIG CLIN: HCPCS | Performed by: NURSE PRACTITIONER

## 2022-08-04 PROCEDURE — G8417 CALC BMI ABV UP PARAM F/U: HCPCS | Performed by: NURSE PRACTITIONER

## 2022-08-04 NOTE — PROGRESS NOTES
Navya Meyer is a 28 y.o. female 18w2d        OB History    Para Term  AB Living   2 1 1     1   SAB IAB Ectopic Molar Multiple Live Births             1      # Outcome Date GA Lbr Wilber/2nd Weight Sex Delivery Anes PTL Lv   2 Current            1 Term 18 38w0d / 01:00 5 lb 13.7 oz (2.655 kg) M Vag-Spont  N JEANNETTE       Vitals  BP: 126/86  Weight: 171 lb (77.6 kg)  Heart Rate: 99  Patient Position: Sitting  Albumin: Negative  Glucose: Negative  Movement: Present    The patient was seen and evaluated. There was positive fetal movements. No contractions or leakage of fluid. Signs and symptoms of  labor as well as labor were reviewed. The Nuchal Translucency testing was reviewed and found to be too far along A single marker MSAFP was reviewed and found to be declined. The patients anatomy ultrasound has been completed and reviewed with patient. TOP ST OH Reviewed. A 28 week lab panel was ordered. This includes a (HH, 1 hr GTT, U/A C&S). The patient is to complete this in the next two to four weeks. The S/S of Pre-Eclampsia were reviewed with the patient in detail. She is to report any of these if they occur. She currently denies any of these. The patient is RH positive Rhogam Ordered no    The patient was instructed on fetal kick counts and was given a kick sheet to complete every 8 hours. This is to begin at 28 weeks gestation. She was instructed that the baby should move at a minimum of ten times within one hour after a meal. The patient was instructed to lay down on her left side twenty minutes after eating and count movements for up to one hour with a target value of ten movements. She was instructed to notify the office if she did not make that target after two attempts or if after any attempt there was less than four movements. 2022 Daily baby ASA 81mg PO for prevention of preeclampsia in pregnant women at high risk recommended by USPSTF/ACOG.   22  PRAF completed    TDAP @ 27 weeks    Assessment:  Patricia Bello is a 28 y.o. female  2.   3. 25w1d    Patient Active Problem List    Diagnosis Date Noted    Late prenatal care 2022     Priority: High     Started at 19 weeks      H/O pre-eclampsia 2022     Priority: High    Genital warts 2022     Priority: High    History of gestational hypertension 2020    Lactose intolerance 10/31/2017        Diagnosis Orders   1. 25 weeks gestation of pregnancy        2. Supervision of high risk pregnancy in second trimester        3. Late prenatal care        4. H/O pre-eclampsia        5. History of gestational hypertension              Plan:  The patient will return to the office for her next visit in 3 weeks. See antepartum flow sheet. Reviewed s/sx of  labor and preeclampsia  FKCs starting at 28 weeks          Patient was seen with total face to face time of 20 minutes. More than 50% of this visit was on counseling and education regarding her    Diagnosis Orders   1. 25 weeks gestation of pregnancy        2. Supervision of high risk pregnancy in second trimester        3. Late prenatal care        4. H/O pre-eclampsia        5. History of gestational hypertension         and her options. She was also counseled on her preventative health maintenance recommendations and follow-up.

## 2022-08-24 ENCOUNTER — ROUTINE PRENATAL (OUTPATIENT)
Dept: PERINATAL CARE | Age: 27
End: 2022-08-24
Payer: MEDICARE

## 2022-08-24 ENCOUNTER — HOSPITAL ENCOUNTER (OUTPATIENT)
Age: 27
Setting detail: SPECIMEN
Discharge: HOME | End: 2022-08-24
Payer: MEDICARE

## 2022-08-24 VITALS
BODY MASS INDEX: 34.36 KG/M2 | HEIGHT: 60 IN | DIASTOLIC BLOOD PRESSURE: 73 MMHG | HEART RATE: 102 BPM | SYSTOLIC BLOOD PRESSURE: 123 MMHG | TEMPERATURE: 98.2 F | WEIGHT: 175 LBS | RESPIRATION RATE: 16 BRPM

## 2022-08-24 DIAGNOSIS — O43.103 PLACENTAL ABNORMALITY IN THIRD TRIMESTER: ICD-10-CM

## 2022-08-24 DIAGNOSIS — O09.293 HISTORY OF PRE-ECLAMPSIA IN PRIOR PREGNANCY, CURRENTLY PREGNANT, THIRD TRIMESTER: Primary | ICD-10-CM

## 2022-08-24 DIAGNOSIS — O09.293 HISTORY OF INTRAUTERINE GROWTH RESTRICTION IN PRIOR PREGNANCY, CURRENTLY PREGNANT, THIRD TRIMESTER: ICD-10-CM

## 2022-08-24 DIAGNOSIS — O99.213 OBESITY AFFECTING PREGNANCY IN THIRD TRIMESTER: ICD-10-CM

## 2022-08-24 DIAGNOSIS — O09.33 INSUFFICIENT PRENATAL CARE IN THIRD TRIMESTER: ICD-10-CM

## 2022-08-24 DIAGNOSIS — Z3A.28 28 WEEKS GESTATION OF PREGNANCY: ICD-10-CM

## 2022-08-24 LAB
ABDOMINAL CIRCUMFERENCE: NORMAL
BIPARIETAL DIAMETER: NORMAL
CREATININE URINE: 137.8 MG/DL (ref 28–217)
ESTIMATED FETAL WEIGHT: NORMAL
FEMORAL DIAMETER: NORMAL
HC/AC: NORMAL
HEAD CIRCUMFERENCE: NORMAL
SEND OUT REPORT: NORMAL
TEST NAME: NORMAL
TOTAL PROTEIN, URINE: 11 MG/DL
URINE TOTAL PROTEIN CREATININE RATIO: 0.08 (ref 0–0.2)

## 2022-08-24 PROCEDURE — 36415 COLL VENOUS BLD VENIPUNCTURE: CPT

## 2022-08-24 PROCEDURE — 84156 ASSAY OF PROTEIN URINE: CPT

## 2022-08-24 PROCEDURE — G8417 CALC BMI ABV UP PARAM F/U: HCPCS | Performed by: OBSTETRICS & GYNECOLOGY

## 2022-08-24 PROCEDURE — 82570 ASSAY OF URINE CREATININE: CPT

## 2022-08-24 PROCEDURE — 76819 FETAL BIOPHYS PROFIL W/O NST: CPT | Performed by: OBSTETRICS & GYNECOLOGY

## 2022-08-24 PROCEDURE — G8427 DOCREV CUR MEDS BY ELIG CLIN: HCPCS | Performed by: OBSTETRICS & GYNECOLOGY

## 2022-08-24 PROCEDURE — 99244 OFF/OP CNSLTJ NEW/EST MOD 40: CPT | Performed by: OBSTETRICS & GYNECOLOGY

## 2022-08-24 PROCEDURE — 76811 OB US DETAILED SNGL FETUS: CPT | Performed by: OBSTETRICS & GYNECOLOGY

## 2022-08-29 PROBLEM — Z87.59 HISTORY OF PRIOR PREGNANCY WITH IUGR NEWBORN: Status: ACTIVE | Noted: 2022-08-29

## 2022-09-06 ENCOUNTER — TELEPHONE (OUTPATIENT)
Dept: OBGYN CLINIC | Age: 27
End: 2022-09-06

## 2022-09-07 ENCOUNTER — TELEPHONE (OUTPATIENT)
Dept: PERINATAL CARE | Age: 27
End: 2022-09-07

## 2022-09-07 NOTE — TELEPHONE ENCOUNTER
Unable to reach patient, left message (that ID'd her as Jane Chowdhury") to phone the office for follow-up. Gave contact details.

## 2022-09-13 ENCOUNTER — TELEPHONE (OUTPATIENT)
Dept: OBGYN CLINIC | Age: 27
End: 2022-09-13

## 2022-09-20 ENCOUNTER — TELEPHONE (OUTPATIENT)
Dept: OBGYN CLINIC | Age: 27
End: 2022-09-20

## 2022-10-06 ENCOUNTER — TELEPHONE (OUTPATIENT)
Dept: OBGYN CLINIC | Age: 27
End: 2022-10-06

## 2022-10-10 ENCOUNTER — TELEPHONE (OUTPATIENT)
Dept: OBGYN CLINIC | Age: 27
End: 2022-10-10

## 2022-10-12 ENCOUNTER — TELEPHONE (OUTPATIENT)
Dept: OBGYN CLINIC | Age: 27
End: 2022-10-12

## 2022-10-14 ENCOUNTER — ROUTINE PRENATAL (OUTPATIENT)
Dept: PERINATAL CARE | Age: 27
End: 2022-10-14
Payer: MEDICARE

## 2022-10-14 VITALS
HEIGHT: 60 IN | WEIGHT: 189 LBS | HEART RATE: 123 BPM | SYSTOLIC BLOOD PRESSURE: 129 MMHG | DIASTOLIC BLOOD PRESSURE: 77 MMHG | BODY MASS INDEX: 37.11 KG/M2 | TEMPERATURE: 97.8 F | RESPIRATION RATE: 16 BRPM

## 2022-10-14 DIAGNOSIS — O28.5 ABNORMAL GENETIC TEST DURING PREGNANCY: ICD-10-CM

## 2022-10-14 DIAGNOSIS — O43.103 PLACENTAL ABNORMALITY IN THIRD TRIMESTER: ICD-10-CM

## 2022-10-14 DIAGNOSIS — O09.33 INSUFFICIENT PRENATAL CARE IN THIRD TRIMESTER: Primary | ICD-10-CM

## 2022-10-14 DIAGNOSIS — O09.293 HISTORY OF PRE-ECLAMPSIA IN PRIOR PREGNANCY, CURRENTLY PREGNANT, THIRD TRIMESTER: ICD-10-CM

## 2022-10-14 DIAGNOSIS — Z3A.35 35 WEEKS GESTATION OF PREGNANCY: ICD-10-CM

## 2022-10-14 DIAGNOSIS — O99.213 OBESITY AFFECTING PREGNANCY IN THIRD TRIMESTER: ICD-10-CM

## 2022-10-14 DIAGNOSIS — O09.293 HISTORY OF INTRAUTERINE GROWTH RESTRICTION IN PRIOR PREGNANCY, CURRENTLY PREGNANT, THIRD TRIMESTER: ICD-10-CM

## 2022-10-14 DIAGNOSIS — Z13.89 ENCOUNTER FOR ROUTINE SCREENING FOR MALFORMATION USING ULTRASONICS: ICD-10-CM

## 2022-10-14 LAB
ABDOMINAL CIRCUMFERENCE: NORMAL
BIPARIETAL DIAMETER: NORMAL
ESTIMATED FETAL WEIGHT: NORMAL
FEMORAL DIAMETER: NORMAL
HC/AC: NORMAL
HEAD CIRCUMFERENCE: NORMAL

## 2022-10-14 PROCEDURE — 99999 PR OFFICE/OUTPT VISIT,PROCEDURE ONLY: CPT | Performed by: OBSTETRICS & GYNECOLOGY

## 2022-10-14 PROCEDURE — 76819 FETAL BIOPHYS PROFIL W/O NST: CPT | Performed by: OBSTETRICS & GYNECOLOGY

## 2022-10-14 PROCEDURE — 76816 OB US FOLLOW-UP PER FETUS: CPT | Performed by: OBSTETRICS & GYNECOLOGY

## 2022-10-14 NOTE — PROGRESS NOTES
Obstetric/Gynecology Maternal Fetal Medicine Resident Note      Patient declines MFM physician formal consult for Alpha Thalassemia carrier.      Loraine Saleh DO  OBGYN Resident, PGY2  [facility]  10/14/2022, 10:52 AM

## 2022-10-14 NOTE — PROGRESS NOTES
Please refer to non-invasive prenatal testing/NIPT results (with the Lavalette Complete test from [facility]). Please refer to the completed Five Gene Carrier Screen (with the Lavalette test from [facility]). Advise paternal carrier screening to be performed on the father of the fetus: secondary to positive maternal carrier mutation testing results. Options with respect to offering the patient invasive genetic prenatal testing (including to evaluate for fetal alpha thalassemia) could not be completed today, as the patient declines a Maternal-Fetal Medicine physician consultation today. Patient declines Maternal-Fetal Medicine follow-up consultation (for maternal alpha thalassemia silent carrier results on prenatal genetic carrier testing). Maternal-Fetal Medicine (MFM) attending physician will defer all management for the above medical/obstetrical complications of pregnancy to the primary attending obstetrical physician, as a result. Therefore, only an ultrasound evaluation was completed today in the MFM office. Please refer to [address] [place] resident follow-up progress note in EPIC.

## 2022-10-21 ENCOUNTER — TELEPHONE (OUTPATIENT)
Dept: OBGYN CLINIC | Age: 27
End: 2022-10-21

## 2022-11-03 ENCOUNTER — TELEPHONE (OUTPATIENT)
Dept: OBGYN CLINIC | Age: 27
End: 2022-11-03

## 2022-11-14 ENCOUNTER — APPOINTMENT (OUTPATIENT)
Dept: LABOR AND DELIVERY | Age: 27
DRG: 540 | End: 2022-11-14
Payer: MEDICARE

## 2022-11-14 ENCOUNTER — HOSPITAL ENCOUNTER (INPATIENT)
Age: 27
LOS: 4 days | Discharge: HOME | DRG: 540 | End: 2022-11-18
Attending: OBSTETRICS & GYNECOLOGY | Admitting: SPECIALIST
Payer: MEDICARE

## 2022-11-14 ENCOUNTER — ROUTINE PRENATAL (OUTPATIENT)
Dept: OBGYN CLINIC | Age: 27
End: 2022-11-14
Payer: MEDICARE

## 2022-11-14 VITALS
DIASTOLIC BLOOD PRESSURE: 86 MMHG | BODY MASS INDEX: 39.84 KG/M2 | WEIGHT: 204 LBS | HEART RATE: 96 BPM | SYSTOLIC BLOOD PRESSURE: 138 MMHG

## 2022-11-14 DIAGNOSIS — Z87.59 H/O PRE-ECLAMPSIA: ICD-10-CM

## 2022-11-14 DIAGNOSIS — Z91.199 NONCOMPLIANT PREGNANT PATIENT IN THIRD TRIMESTER: ICD-10-CM

## 2022-11-14 DIAGNOSIS — Z87.59 HISTORY OF PRIOR PREGNANCY WITH IUGR NEWBORN: ICD-10-CM

## 2022-11-14 DIAGNOSIS — Z3A.39 39 WEEKS GESTATION OF PREGNANCY: ICD-10-CM

## 2022-11-14 DIAGNOSIS — O09.893 NONCOMPLIANT PREGNANT PATIENT IN THIRD TRIMESTER: ICD-10-CM

## 2022-11-14 DIAGNOSIS — O09.30 LATE PRENATAL CARE: Primary | ICD-10-CM

## 2022-11-14 DIAGNOSIS — Z98.891 S/P PRIMARY LOW TRANSVERSE C-SECTION: Primary | ICD-10-CM

## 2022-11-14 LAB
ABO/RH: NORMAL
ABSOLUTE EOS #: 0.21 K/UL (ref 0–0.44)
ABSOLUTE IMMATURE GRANULOCYTE: 0.09 K/UL (ref 0–0.3)
ABSOLUTE LYMPH #: 2.22 K/UL (ref 1.1–3.7)
ABSOLUTE MONO #: 0.98 K/UL (ref 0.1–1.2)
ALBUMIN SERPL-MCNC: 3 G/DL (ref 3.5–5.2)
ALBUMIN/GLOBULIN RATIO: 1 (ref 1–2.5)
ALP BLD-CCNC: 650 U/L (ref 35–104)
ALT SERPL-CCNC: 7 U/L (ref 5–33)
ANION GAP SERPL CALCULATED.3IONS-SCNC: 12 MMOL/L (ref 9–17)
ANTIBODY SCREEN: NEGATIVE
ARM BAND NUMBER: NORMAL
AST SERPL-CCNC: 32 U/L
BASOPHILS # BLD: 0 % (ref 0–2)
BASOPHILS ABSOLUTE: 0.03 K/UL (ref 0–0.2)
BILIRUB SERPL-MCNC: 0.3 MG/DL (ref 0.3–1.2)
BUN BLDV-MCNC: 10 MG/DL (ref 6–20)
CALCIUM SERPL-MCNC: 9.2 MG/DL (ref 8.6–10.4)
CHLORIDE BLD-SCNC: 106 MMOL/L (ref 98–107)
CO2: 21 MMOL/L (ref 20–31)
CREAT SERPL-MCNC: 0.61 MG/DL (ref 0.5–0.9)
EOSINOPHILS RELATIVE PERCENT: 2 % (ref 1–4)
EXPIRATION DATE: NORMAL
GFR SERPL CREATININE-BSD FRML MDRD: >60 ML/MIN/1.73M2
GLUCOSE BLD-MCNC: 92 MG/DL (ref 70–99)
HCT VFR BLD CALC: 29.9 % (ref 36.3–47.1)
HEMOGLOBIN: 9.4 G/DL (ref 11.9–15.1)
IMMATURE GRANULOCYTES: 1 %
LYMPHOCYTES # BLD: 23 % (ref 24–43)
MCH RBC QN AUTO: 24.1 PG (ref 25.2–33.5)
MCHC RBC AUTO-ENTMCNC: 31.4 G/DL (ref 28.4–34.8)
MCV RBC AUTO: 76.7 FL (ref 82.6–102.9)
MONOCYTES # BLD: 10 % (ref 3–12)
NRBC AUTOMATED: 1.4 PER 100 WBC
PDW BLD-RTO: 17.2 % (ref 11.8–14.4)
PLATELET # BLD: 326 K/UL (ref 138–453)
PMV BLD AUTO: 10 FL (ref 8.1–13.5)
POTASSIUM SERPL-SCNC: 4.6 MMOL/L (ref 3.7–5.3)
RBC # BLD: 3.9 M/UL (ref 3.95–5.11)
RBC # BLD: ABNORMAL 10*6/UL
SEG NEUTROPHILS: 64 % (ref 36–65)
SEGMENTED NEUTROPHILS ABSOLUTE COUNT: 5.96 K/UL (ref 1.5–8.1)
SODIUM BLD-SCNC: 139 MMOL/L (ref 135–144)
TOTAL PROTEIN: 5.9 G/DL (ref 6.4–8.3)
WBC # BLD: 9.5 K/UL (ref 3.5–11.3)

## 2022-11-14 PROCEDURE — 85025 COMPLETE CBC W/AUTO DIFF WBC: CPT

## 2022-11-14 PROCEDURE — 86850 RBC ANTIBODY SCREEN: CPT

## 2022-11-14 PROCEDURE — 6370000000 HC RX 637 (ALT 250 FOR IP): Performed by: STUDENT IN AN ORGANIZED HEALTH CARE EDUCATION/TRAINING PROGRAM

## 2022-11-14 PROCEDURE — G8427 DOCREV CUR MEDS BY ELIG CLIN: HCPCS | Performed by: OBSTETRICS & GYNECOLOGY

## 2022-11-14 PROCEDURE — 2580000003 HC RX 258

## 2022-11-14 PROCEDURE — 2500000003 HC RX 250 WO HCPCS: Performed by: STUDENT IN AN ORGANIZED HEALTH CARE EDUCATION/TRAINING PROGRAM

## 2022-11-14 PROCEDURE — G8484 FLU IMMUNIZE NO ADMIN: HCPCS | Performed by: OBSTETRICS & GYNECOLOGY

## 2022-11-14 PROCEDURE — 6360000002 HC RX W HCPCS: Performed by: STUDENT IN AN ORGANIZED HEALTH CARE EDUCATION/TRAINING PROGRAM

## 2022-11-14 PROCEDURE — G8417 CALC BMI ABV UP PARAM F/U: HCPCS | Performed by: OBSTETRICS & GYNECOLOGY

## 2022-11-14 PROCEDURE — 80053 COMPREHEN METABOLIC PANEL: CPT

## 2022-11-14 PROCEDURE — 1220000000 HC SEMI PRIVATE OB R&B

## 2022-11-14 PROCEDURE — 86900 BLOOD TYPING SEROLOGIC ABO: CPT

## 2022-11-14 PROCEDURE — 3E0P7VZ INTRODUCTION OF HORMONE INTO FEMALE REPRODUCTIVE, VIA NATURAL OR ARTIFICIAL OPENING: ICD-10-PCS | Performed by: SPECIALIST

## 2022-11-14 PROCEDURE — 99214 OFFICE O/P EST MOD 30 MIN: CPT | Performed by: OBSTETRICS & GYNECOLOGY

## 2022-11-14 PROCEDURE — 1036F TOBACCO NON-USER: CPT | Performed by: OBSTETRICS & GYNECOLOGY

## 2022-11-14 PROCEDURE — 86901 BLOOD TYPING SEROLOGIC RH(D): CPT

## 2022-11-14 RX ORDER — SODIUM CHLORIDE 0.9 % (FLUSH) 0.9 %
5-40 SYRINGE (ML) INJECTION EVERY 12 HOURS SCHEDULED
Status: DISCONTINUED | OUTPATIENT
Start: 2022-11-14 | End: 2022-11-15

## 2022-11-14 RX ORDER — SODIUM CHLORIDE 0.9 % (FLUSH) 0.9 %
5-40 SYRINGE (ML) INJECTION PRN
Status: DISCONTINUED | OUTPATIENT
Start: 2022-11-14 | End: 2022-11-15

## 2022-11-14 RX ORDER — ACETAMINOPHEN 500 MG
1000 TABLET ORAL EVERY 6 HOURS PRN
Status: DISCONTINUED | OUTPATIENT
Start: 2022-11-14 | End: 2022-11-18 | Stop reason: HOSPADM

## 2022-11-14 RX ORDER — ONDANSETRON 2 MG/ML
4 INJECTION INTRAMUSCULAR; INTRAVENOUS EVERY 6 HOURS PRN
Status: DISCONTINUED | OUTPATIENT
Start: 2022-11-14 | End: 2022-11-18 | Stop reason: HOSPADM

## 2022-11-14 RX ORDER — LABETALOL HYDROCHLORIDE 5 MG/ML
20 INJECTION, SOLUTION INTRAVENOUS ONCE
Status: COMPLETED | OUTPATIENT
Start: 2022-11-14 | End: 2022-11-14

## 2022-11-14 RX ORDER — SODIUM CHLORIDE, SODIUM LACTATE, POTASSIUM CHLORIDE, AND CALCIUM CHLORIDE .6; .31; .03; .02 G/100ML; G/100ML; G/100ML; G/100ML
1000 INJECTION, SOLUTION INTRAVENOUS PRN
Status: DISCONTINUED | OUTPATIENT
Start: 2022-11-14 | End: 2022-11-15

## 2022-11-14 RX ORDER — SODIUM CHLORIDE 9 MG/ML
25 INJECTION, SOLUTION INTRAVENOUS PRN
Status: DISCONTINUED | OUTPATIENT
Start: 2022-11-14 | End: 2022-11-15

## 2022-11-14 RX ORDER — CALCIUM GLUCONATE 94 MG/ML
1000 INJECTION, SOLUTION INTRAVENOUS PRN
Status: DISCONTINUED | OUTPATIENT
Start: 2022-11-14 | End: 2022-11-18 | Stop reason: HOSPADM

## 2022-11-14 RX ORDER — DIPHENHYDRAMINE HCL 25 MG
25 TABLET ORAL EVERY 4 HOURS PRN
Status: DISCONTINUED | OUTPATIENT
Start: 2022-11-14 | End: 2022-11-18 | Stop reason: HOSPADM

## 2022-11-14 RX ORDER — SODIUM CHLORIDE, SODIUM LACTATE, POTASSIUM CHLORIDE, CALCIUM CHLORIDE 600; 310; 30; 20 MG/100ML; MG/100ML; MG/100ML; MG/100ML
INJECTION, SOLUTION INTRAVENOUS CONTINUOUS
Status: DISCONTINUED | OUTPATIENT
Start: 2022-11-14 | End: 2022-11-16

## 2022-11-14 RX ORDER — MAGNESIUM SULFATE HEPTAHYDRATE 40 MG/ML
4000 INJECTION, SOLUTION INTRAVENOUS ONCE
Status: COMPLETED | OUTPATIENT
Start: 2022-11-14 | End: 2022-11-14

## 2022-11-14 RX ORDER — SODIUM CHLORIDE 0.9 % (FLUSH) 0.9 %
5-40 SYRINGE (ML) INJECTION PRN
Status: DISCONTINUED | OUTPATIENT
Start: 2022-11-14 | End: 2022-11-18 | Stop reason: HOSPADM

## 2022-11-14 RX ORDER — SODIUM CHLORIDE, SODIUM LACTATE, POTASSIUM CHLORIDE, AND CALCIUM CHLORIDE .6; .31; .03; .02 G/100ML; G/100ML; G/100ML; G/100ML
500 INJECTION, SOLUTION INTRAVENOUS PRN
Status: DISCONTINUED | OUTPATIENT
Start: 2022-11-14 | End: 2022-11-15

## 2022-11-14 RX ORDER — SODIUM CHLORIDE 9 MG/ML
INJECTION, SOLUTION INTRAVENOUS PRN
Status: DISCONTINUED | OUTPATIENT
Start: 2022-11-14 | End: 2022-11-18 | Stop reason: HOSPADM

## 2022-11-14 RX ADMIN — MAGNESIUM SULFATE HEPTAHYDRATE 4000 MG: 40 INJECTION, SOLUTION INTRAVENOUS at 23:27

## 2022-11-14 RX ADMIN — SODIUM CHLORIDE, POTASSIUM CHLORIDE, SODIUM LACTATE AND CALCIUM CHLORIDE: 600; 310; 30; 20 INJECTION, SOLUTION INTRAVENOUS at 23:07

## 2022-11-14 RX ADMIN — DINOPROSTONE 10 MG: 10 INSERT VAGINAL at 23:56

## 2022-11-14 RX ADMIN — MAGNESIUM SULFATE HEPTAHYDRATE 2000 MG/HR: 40 INJECTION, SOLUTION INTRAVENOUS at 23:51

## 2022-11-14 RX ADMIN — Medication 20 MG: at 23:21

## 2022-11-14 NOTE — PROGRESS NOTES
Leslie Pool is a 28 y.o. female 39w5d        OB History    Para Term  AB Living   2 1 1     1   SAB IAB Ectopic Molar Multiple Live Births             1      # Outcome Date GA Lbr Wilber/2nd Weight Sex Delivery Anes PTL Lv   2 Current            1 Term 18 38w0d / 01:00 5 lb 13.7 oz (2.655 kg) M Vag-Spont  N JEANNETTE       Vitals  BP: (!) 158/94  Weight: 204 lb (92.5 kg)  Heart Rate: 96  Patient Position: Sitting      The patient was seen and evaluated. There was positive fetal movements. No contractions or leakage of fluid. Signs and symptoms of labor were reviewed. The S/S of Pre-Eclampsia were reviewed with the patient in detail. She is to report any of these if they occur. She currently denies any of these. The patient was instructed on fetal kick counts and was given a kick sheet to complete every 8 hours. She was instructed that the baby should move at a minimum of ten times within one hour after a meal. The patient was instructed to lay down on her left side twenty minutes after eating and count movements for up to one hour with a target value of ten movements. She was instructed to notify the office if she did not make that target after two attempts or if after any attempt there was less than four movements. The patient reports that the targets have been made Yes.    2022 Daily baby ASA 81mg PO for prevention of preeclampsia in pregnant women at high risk recommended by USPSTF/ACOG. 22  PRAF completed    TDAP @ 27 weeks      T-Dap Vaccine Completed (27-36 weeks): No    Allergies: Allergies as of 2022    (No Known Allergies)         Group Beta Strep collection was completed. Yes  GBS Results:   No visits with results within 3 Week(s) from this visit. Latest known visit with results is:   Hospital Outpatient Visit on 2022   Component Date Value Ref Range Status    Total Protein, Urine 2022 11  mg/dL Final    No normal range established.     Creatinine, Ur 2022 137.8  28.0 - 217.0 mg/dL Final    Urine Total Protein Creatinine Rat* 2022 0.08  0.00 - 0.20 Final    Test Name 2022 TOSHIA COMPLETE   Final    Send Out Report 2022 FORWARD TOSHIA KIT   Final   ]        Cervical Exam was:   1/60%/-1/V/I cm dilated    The literature regarding a questionable link to pitocin augmentation and induction of labor, the assistance of labor contractions and the initiation of contractions to help delivery, have been reviewed with the patient regarding the increased potential of having a  with Attention Deficit Hyperactivity Disorder and or Autism. These two disorders and the ramifications of their impact on a child and the family caring for that child has been reviewed with the patient in detail. She was given the risks, benefits and alternatives of the use of this medication. She has agreed to its use in the delivery of her unborn child if needed at the time of delivery, Yes. The patient was counseled on the mandatory call ahead policy. She has been instructed to call the office at anytime prior to going into the hospital so the on-call physician may direct her to the appropriate facility for care. Exceptions were reviewed including but not limited to: Decreased fetal movement, vaginal Bleeding or hemorrhage, trauma, readily expectant delivery, or any instance where she feels 911 should be utilized. The patient was counseled on Labor & Delivery. Route of delivery and counseling on vaginal, operative vaginal, and  sections were completed with the risks of each to both the patient as well as her baby. The possibility of a blood transfusion was discussed as well. The patient was not opposed to receiving a transfusion if needed. The patient was counseled on types of analgesia during labor and is considering either Regional or IV medication the risks, benefits and alternatives were discussed.       Testing:  Not indicated  The recommendation to proceed to fetal kick counts every 8 hours daily instead of Non stress testing, (as per Bala BELL, CHANELL Roman guidance for Covid-19 testing, American Journal of Obstetrics & Gynecology, 3750)        Assessment:  1. Rosey Alpers is a 28 y.o. female  2.   3. 39w5d    Patient Active Problem List    Diagnosis Date Noted    History of prior pregnancy with IUGR  2022     Priority: High    Late prenatal care 2022     Priority: High     Overview Note:     Started at 23 weeks      H/O pre-eclampsia 2022     Priority: High    Genital warts 2022     Priority: High    History of gestational hypertension 2020    Lactose intolerance 10/31/2017        Diagnosis Orders   1. Late prenatal care        2. H/O pre-eclampsia        3. History of prior pregnancy with IUGR         4. 39 weeks gestation of pregnancy                Plan:  The patient will return to the office for her next visit in 2--3 weeks for postpartum visit. See antepartum flow sheet. Pt had elevated pressure x 1 with normal repeat. Pt with h/o pre-eclampsia. Recommended to patient for labs/ repeat BP at [facility]. Vs. Pt states she needs to go home first and get her other child taken care of and get things situated. Pt wishes to go in later tonight for labs/ BP check and if still elevated IOL. Pt was counseled on risks/ benefits/ alternative options. Pt given S/Sx pre-eclampsia and if occur to go to [facility]. Vs  Medical Induction of Labor     Definition   In some cases, the doctor needs to help labor begin by usin [address] Other procedures   This is done instead of waiting for your body to go into labor on its own. In the [address], nearly one in five labors is induced. Reasons for Procedure   The most common reason to have an induction is that the pregnancy has gone two or more weeks past the due date.  In this situation, the baby may:    Get too large for a vaginal delivery    Not be able to receive enough oxygen through the placenta (the organ that links the mother and the baby)   Other reasons for induction include:    Water breaks and contractions do not begin    High blood pressure or diabetes in the mother    Infection in the uterus    The baby is not growing properly    Low amniotic fluid level   Possible Complications   The same complications that may occur from a spontaneous delivery also apply to an induced delivery, as well as the following risks:    Stalled labor--If the medicine does not trigger labor, you may need a  section ().  Strong contractions--The medicine that causes contractions could make them too strong. Although rare, this can lead to fetal distress and uterine rupture. In the event that your contractions are too strong, your doctor will lower the dose or stop the medicine. Be sure to discuss these risks with your doctor before the procedure. What to Expect   Prior to Procedure   While the same instructions apply for an induced labor as for a spontaneous birth, there are some differences. Do not eat too much before arriving at the hospital. (It is okay to have clear fluids, though.) The medicines can create very strong contractions and could upset your stomach. Contractions slow the digestive process, so your stomach will remain full. This can cause a problem if you need general anesthesia . Description of the Procedure Cervical Ripening   To deliver your baby vaginally, the cervix will need to [address] 8.  This means it needs to soften, thin, and open to prepare for delivery. If your cervix is not doing this already, your doctor may aid this process by giving you medicine, which may be a:    Gel that is applied to the cervix    Suppository put in the vagina    Pill taken by mouth   The cervical ripening process can last for up to a few days.    There are also procedures that your doctor may try to aid cervical ripening, such as:    Strip the membranes (separate your cervix from the tissues around the baby's head)    Expand a small balloon-tipped catheter in the uterus    Place small cylinders that contain a type of sponge-like seaweed into the uterus     Changes in the Cervix During Pregnancy   Contractions   If contractions have not started once your cervix is ripe, your doctor will give you a drug that causes contractions. The drug is a man-made version of a hormone called oxytocin. This hormone is produced by your body during active labor. The drug will be adjusted during labor to strengthen or weaken the contractions. Once contractions begin, the labor and birth process will be the same as a spontaneous delivery. Anesthesia   The same pain medicines are available for an induced labor as for a spontaneous delivery, including:    Pain medicine given into your vein    Epidural block    Spinal block    Local anesthesia   Immediately After Procedure   If everything goes well, after the induction you will vaginally deliver a healthy baby. How Long Will It Take? It can be hours to several days (very rarely) from the time you are induced until the delivery. If your cervix is not ripe when you are scheduled for the induction, labor and delivery could take 2-3 days. It could take longer for first-time mothers and for pre-term babies. How Much Will It Hurt? Labor causes severe pain. Talk to your doctor about ways to manage the pain. [address] Stay   The usual length of stay is 1-3 days. Your doctor may choose to keep you longer if you have any problems. Postoperative Care   The care after an induced labor is the same as for a spontaneous birth.    Call Your Doctor   When you go home after having a vaginal delivery, call your doctor if any of the following occurs:    An unexplained fever of 100.4°F (38°C) or above in the first two weeks    Soaking more than one sanitary napkin an hour or if the bleeding level increases    Wounds that become red, swollen, or drain pus    New pain, swelling, or tenderness in your legs    Hot-to-the-touch, significantly reddened, sore breasts    Any cracking or bleeding from the nipple or areola (the dark-colored area of the breast)    Foul-smelling vaginal discharge    Painful urination or a sudden urge to urinate, inability to control urination    Increasing pain in the vaginal area    Cough or chest pain, nausea, or vomiting    Depression, hallucinations, suicidal thoughts, or any thoughts of harming your baby   In case of an emergency, CALL 911 . The patient was counseled in depth regarding the facility that an induction will take place. They were informed that this decision will be made jointly with the patient and the physician but may need to be at a specific facility that the on call physician determines is best for the expectant mother or her unborn child. This decision is made for the safety of the patient and is made considering on call staff, current situations the on call physician is dealing with and the current care responsibilities for all of the patients the on call physician is responsible for at the time of the induction. Unfortunately the patient may have to be induced at a facility which is not her initial choice. She was also informed that the scheduled induction and facility may need to be changed due to the aforementioned constraints the on call physician is managing. This was discussed with the patient in detail and all questions were answered and she verbalized understanding. Patient was seen with total face to face time of 20 minutes. More than 50% of this visit was on counseling and education regarding her    Diagnosis Orders   1. Late prenatal care        2. H/O pre-eclampsia        3. History of prior pregnancy with IUGR         4. 39 weeks gestation of pregnancy         and her options.  She was also counseled on her preventative health maintenance recommendations and follow-up.

## 2022-11-15 ENCOUNTER — ANESTHESIA (OUTPATIENT)
Dept: LABOR AND DELIVERY | Age: 27
DRG: 540 | End: 2022-11-15
Payer: MEDICARE

## 2022-11-15 ENCOUNTER — ANESTHESIA EVENT (OUTPATIENT)
Dept: LABOR AND DELIVERY | Age: 27
DRG: 540 | End: 2022-11-15
Payer: MEDICARE

## 2022-11-15 PROBLEM — Z87.59 H/O PRE-ECLAMPSIA: Status: RESOLVED | Noted: 2022-06-28 | Resolved: 2022-11-15

## 2022-11-15 PROBLEM — O14.10 PREECLAMPSIA, SEVERE: Status: ACTIVE | Noted: 2022-11-15

## 2022-11-15 PROBLEM — Z98.891 S/P PRIMARY LOW TRANSVERSE C-SECTION: Status: ACTIVE | Noted: 2022-11-15

## 2022-11-15 LAB
AMPHETAMINE SCREEN URINE: NEGATIVE
BARBITURATE SCREEN URINE: NEGATIVE
BENZODIAZEPINE SCREEN, URINE: NEGATIVE
CANNABINOID SCREEN URINE: NEGATIVE
COCAINE METABOLITE, URINE: NEGATIVE
CREATININE URINE: 119.5 MG/DL (ref 28–217)
FENTANYL URINE: NEGATIVE
MAGNESIUM: 4.4 MG/DL (ref 1.6–2.6)
MAGNESIUM: 5.3 MG/DL (ref 1.6–2.6)
MAGNESIUM: 5.6 MG/DL (ref 1.6–2.6)
METHADONE SCREEN, URINE: NEGATIVE
OPIATES, URINE: NEGATIVE
OXYCODONE SCREEN URINE: NEGATIVE
PHENCYCLIDINE, URINE: NEGATIVE
TEST INFORMATION: NORMAL
TOTAL PROTEIN, URINE: 222 MG/DL
URINE TOTAL PROTEIN CREATININE RATIO: 1.86 (ref 0–0.2)

## 2022-11-15 PROCEDURE — 3700000000 HC ANESTHESIA ATTENDED CARE: Performed by: OBSTETRICS & GYNECOLOGY

## 2022-11-15 PROCEDURE — 80307 DRUG TEST PRSMV CHEM ANLYZR: CPT

## 2022-11-15 PROCEDURE — 7100000000 HC PACU RECOVERY - FIRST 15 MIN: Performed by: OBSTETRICS & GYNECOLOGY

## 2022-11-15 PROCEDURE — 3700000025 EPIDURAL BLOCK: Performed by: ANESTHESIOLOGY

## 2022-11-15 PROCEDURE — 3609079900 HC CESAREAN SECTION: Performed by: OBSTETRICS & GYNECOLOGY

## 2022-11-15 PROCEDURE — 2500000003 HC RX 250 WO HCPCS

## 2022-11-15 PROCEDURE — 6360000002 HC RX W HCPCS: Performed by: STUDENT IN AN ORGANIZED HEALTH CARE EDUCATION/TRAINING PROGRAM

## 2022-11-15 PROCEDURE — 7100000001 HC PACU RECOVERY - ADDTL 15 MIN: Performed by: OBSTETRICS & GYNECOLOGY

## 2022-11-15 PROCEDURE — 6360000002 HC RX W HCPCS

## 2022-11-15 PROCEDURE — 2580000003 HC RX 258: Performed by: STUDENT IN AN ORGANIZED HEALTH CARE EDUCATION/TRAINING PROGRAM

## 2022-11-15 PROCEDURE — 3700000001 HC ADD 15 MINUTES (ANESTHESIA): Performed by: OBSTETRICS & GYNECOLOGY

## 2022-11-15 PROCEDURE — 2709999900 HC NON-CHARGEABLE SUPPLY: Performed by: OBSTETRICS & GYNECOLOGY

## 2022-11-15 PROCEDURE — 6360000002 HC RX W HCPCS: Performed by: NURSE ANESTHETIST, CERTIFIED REGISTERED

## 2022-11-15 PROCEDURE — 83735 ASSAY OF MAGNESIUM: CPT

## 2022-11-15 PROCEDURE — 84156 ASSAY OF PROTEIN URINE: CPT

## 2022-11-15 PROCEDURE — 6370000000 HC RX 637 (ALT 250 FOR IP)

## 2022-11-15 PROCEDURE — 6370000000 HC RX 637 (ALT 250 FOR IP): Performed by: STUDENT IN AN ORGANIZED HEALTH CARE EDUCATION/TRAINING PROGRAM

## 2022-11-15 PROCEDURE — 1220000000 HC SEMI PRIVATE OB R&B

## 2022-11-15 PROCEDURE — 2500000003 HC RX 250 WO HCPCS: Performed by: NURSE ANESTHETIST, CERTIFIED REGISTERED

## 2022-11-15 PROCEDURE — 36415 COLL VENOUS BLD VENIPUNCTURE: CPT

## 2022-11-15 PROCEDURE — 88307 TISSUE EXAM BY PATHOLOGIST: CPT

## 2022-11-15 PROCEDURE — 82570 ASSAY OF URINE CREATININE: CPT

## 2022-11-15 PROCEDURE — 2580000003 HC RX 258

## 2022-11-15 PROCEDURE — 59514 CESAREAN DELIVERY ONLY: CPT | Performed by: OBSTETRICS & GYNECOLOGY

## 2022-11-15 PROCEDURE — 2500000003 HC RX 250 WO HCPCS: Performed by: STUDENT IN AN ORGANIZED HEALTH CARE EDUCATION/TRAINING PROGRAM

## 2022-11-15 PROCEDURE — 6360000002 HC RX W HCPCS: Performed by: ANESTHESIOLOGY

## 2022-11-15 RX ORDER — VITAMIN A, ASCORBIC ACID, CHOLECALCIFEROL, .ALPHA.-TOCOPHEROL ACETATE, DL-, THIAMINE MONONITRATE, RIBOFLAVIN, NIACINAMIDE, PYRIDOXINE HYDROCHLORIDE, FOLIC ACID, CYANOCOBALAMIN, CALCIUM CARBONATE, IRON, ZINC OXIDE, AND CUPRIC OXIDE 4000; 120; 400; 22; 1.84; 3; 20; 10; 1; 12; 200; 29; 25; 2 [IU]/1; MG/1; [IU]/1; [IU]/1; MG/1; MG/1; MG/1; MG/1; MG/1; UG/1; MG/1; MG/1; MG/1; MG/1
1 TABLET ORAL DAILY
Status: DISCONTINUED | OUTPATIENT
Start: 2022-11-15 | End: 2022-11-18 | Stop reason: HOSPADM

## 2022-11-15 RX ORDER — NALOXONE HYDROCHLORIDE 0.4 MG/ML
0.4 INJECTION, SOLUTION INTRAMUSCULAR; INTRAVENOUS; SUBCUTANEOUS PRN
Status: DISCONTINUED | OUTPATIENT
Start: 2022-11-15 | End: 2022-11-18 | Stop reason: HOSPADM

## 2022-11-15 RX ORDER — METRONIDAZOLE 500 MG/1
500 TABLET ORAL EVERY 8 HOURS SCHEDULED
Status: DISPENSED | OUTPATIENT
Start: 2022-11-16 | End: 2022-11-18

## 2022-11-15 RX ORDER — NIFEDIPINE 30 MG/1
30 TABLET, EXTENDED RELEASE ORAL DAILY
Status: DISCONTINUED | OUTPATIENT
Start: 2022-11-15 | End: 2022-11-15

## 2022-11-15 RX ORDER — DEXAMETHASONE SODIUM PHOSPHATE 10 MG/ML
INJECTION INTRAMUSCULAR; INTRAVENOUS PRN
Status: DISCONTINUED | OUTPATIENT
Start: 2022-11-15 | End: 2022-11-15 | Stop reason: SDUPTHER

## 2022-11-15 RX ORDER — OXYCODONE HYDROCHLORIDE 5 MG/1
10 TABLET ORAL EVERY 4 HOURS PRN
Status: DISCONTINUED | OUTPATIENT
Start: 2022-11-15 | End: 2022-11-18 | Stop reason: HOSPADM

## 2022-11-15 RX ORDER — HYDRALAZINE HYDROCHLORIDE 20 MG/ML
10 INJECTION INTRAMUSCULAR; INTRAVENOUS ONCE
Status: COMPLETED | OUTPATIENT
Start: 2022-11-15 | End: 2022-11-15

## 2022-11-15 RX ORDER — DIPHENHYDRAMINE HYDROCHLORIDE 50 MG/ML
25 INJECTION INTRAMUSCULAR; INTRAVENOUS EVERY 6 HOURS PRN
Status: DISCONTINUED | OUTPATIENT
Start: 2022-11-15 | End: 2022-11-18 | Stop reason: HOSPADM

## 2022-11-15 RX ORDER — WATER 1000 ML/1000ML
INJECTION, SOLUTION INTRAVENOUS
Status: DISCONTINUED
Start: 2022-11-15 | End: 2022-11-15 | Stop reason: WASHOUT

## 2022-11-15 RX ORDER — IBUPROFEN 600 MG/1
600 TABLET ORAL EVERY 6 HOURS
Status: DISCONTINUED | OUTPATIENT
Start: 2022-11-16 | End: 2022-11-18 | Stop reason: HOSPADM

## 2022-11-15 RX ORDER — LABETALOL HYDROCHLORIDE 5 MG/ML
40 INJECTION, SOLUTION INTRAVENOUS ONCE
Status: COMPLETED | OUTPATIENT
Start: 2022-11-15 | End: 2022-11-15

## 2022-11-15 RX ORDER — LABETALOL HYDROCHLORIDE 5 MG/ML
INJECTION, SOLUTION INTRAVENOUS
Status: COMPLETED
Start: 2022-11-15 | End: 2022-11-15

## 2022-11-15 RX ORDER — SODIUM CHLORIDE 0.9 % (FLUSH) 0.9 %
5-40 SYRINGE (ML) INJECTION PRN
Status: DISCONTINUED | OUTPATIENT
Start: 2022-11-15 | End: 2022-11-18 | Stop reason: HOSPADM

## 2022-11-15 RX ORDER — LIDOCAINE HYDROCHLORIDE 20 MG/ML
INJECTION, SOLUTION EPIDURAL; INFILTRATION; INTRACAUDAL; PERINEURAL PRN
Status: DISCONTINUED | OUTPATIENT
Start: 2022-11-15 | End: 2022-11-15 | Stop reason: SDUPTHER

## 2022-11-15 RX ORDER — SODIUM CHLORIDE, SODIUM LACTATE, POTASSIUM CHLORIDE, CALCIUM CHLORIDE 600; 310; 30; 20 MG/100ML; MG/100ML; MG/100ML; MG/100ML
INJECTION, SOLUTION INTRAVENOUS CONTINUOUS
Status: DISCONTINUED | OUTPATIENT
Start: 2022-11-15 | End: 2022-11-15

## 2022-11-15 RX ORDER — SODIUM CHLORIDE 0.9 % (FLUSH) 0.9 %
5-40 SYRINGE (ML) INJECTION EVERY 12 HOURS SCHEDULED
Status: DISCONTINUED | OUTPATIENT
Start: 2022-11-15 | End: 2022-11-18 | Stop reason: HOSPADM

## 2022-11-15 RX ORDER — BISACODYL 10 MG
10 SUPPOSITORY, RECTAL RECTAL DAILY PRN
Status: DISCONTINUED | OUTPATIENT
Start: 2022-11-15 | End: 2022-11-18 | Stop reason: HOSPADM

## 2022-11-15 RX ORDER — ONDANSETRON 2 MG/ML
4 INJECTION INTRAMUSCULAR; INTRAVENOUS EVERY 6 HOURS PRN
Status: DISCONTINUED | OUTPATIENT
Start: 2022-11-15 | End: 2022-11-15

## 2022-11-15 RX ORDER — SODIUM CHLORIDE 9 MG/ML
INJECTION, SOLUTION INTRAVENOUS PRN
Status: DISCONTINUED | OUTPATIENT
Start: 2022-11-15 | End: 2022-11-18 | Stop reason: HOSPADM

## 2022-11-15 RX ORDER — SENNA AND DOCUSATE SODIUM 50; 8.6 MG/1; MG/1
1 TABLET, FILM COATED ORAL DAILY
Qty: 30 TABLET | Refills: 1 | Status: SHIPPED | OUTPATIENT
Start: 2022-11-15

## 2022-11-15 RX ORDER — IBUPROFEN 600 MG/1
600 TABLET ORAL EVERY 6 HOURS PRN
Qty: 40 TABLET | Refills: 0 | Status: SHIPPED | OUTPATIENT
Start: 2022-11-15

## 2022-11-15 RX ORDER — LABETALOL HYDROCHLORIDE 5 MG/ML
20 INJECTION, SOLUTION INTRAVENOUS ONCE
Status: COMPLETED | OUTPATIENT
Start: 2022-11-15 | End: 2022-11-15

## 2022-11-15 RX ORDER — OXYCODONE HYDROCHLORIDE 5 MG/1
5 TABLET ORAL EVERY 6 HOURS PRN
Qty: 20 TABLET | Refills: 0 | Status: SHIPPED | OUTPATIENT
Start: 2022-11-15 | End: 2022-11-20

## 2022-11-15 RX ORDER — FERROUS SULFATE 325(65) MG
325 TABLET ORAL 2 TIMES DAILY
Qty: 90 TABLET | Refills: 3 | Status: SHIPPED | OUTPATIENT
Start: 2022-11-15

## 2022-11-15 RX ORDER — NIFEDIPINE 30 MG/1
30 TABLET, EXTENDED RELEASE ORAL 2 TIMES DAILY
Status: DISCONTINUED | OUTPATIENT
Start: 2022-11-15 | End: 2022-11-16

## 2022-11-15 RX ORDER — SIMETHICONE 80 MG
80 TABLET,CHEWABLE ORAL EVERY 6 HOURS PRN
Status: DISCONTINUED | OUTPATIENT
Start: 2022-11-15 | End: 2022-11-18 | Stop reason: HOSPADM

## 2022-11-15 RX ORDER — LANOLIN 72 %
OINTMENT (GRAM) TOPICAL
Status: DISCONTINUED | OUTPATIENT
Start: 2022-11-15 | End: 2022-11-18 | Stop reason: HOSPADM

## 2022-11-15 RX ORDER — SIMETHICONE 80 MG
80 TABLET,CHEWABLE ORAL 4 TIMES DAILY PRN
Qty: 60 TABLET | Refills: 1 | Status: SHIPPED | OUTPATIENT
Start: 2022-11-15

## 2022-11-15 RX ORDER — POLYETHYLENE GLYCOL 3350 17 G/17G
17 POWDER, FOR SOLUTION ORAL DAILY
Status: DISCONTINUED | OUTPATIENT
Start: 2022-11-15 | End: 2022-11-18 | Stop reason: HOSPADM

## 2022-11-15 RX ORDER — OXYCODONE HYDROCHLORIDE 5 MG/1
5 TABLET ORAL EVERY 4 HOURS PRN
Status: DISCONTINUED | OUTPATIENT
Start: 2022-11-15 | End: 2022-11-18 | Stop reason: HOSPADM

## 2022-11-15 RX ORDER — SODIUM CHLORIDE, SODIUM LACTATE, POTASSIUM CHLORIDE, AND CALCIUM CHLORIDE .6; .31; .03; .02 G/100ML; G/100ML; G/100ML; G/100ML
250 INJECTION, SOLUTION INTRAVENOUS ONCE
Status: COMPLETED | OUTPATIENT
Start: 2022-11-15 | End: 2022-11-15

## 2022-11-15 RX ORDER — TRANEXAMIC ACID 10 MG/ML
1000 INJECTION, SOLUTION INTRAVENOUS ONCE
Status: COMPLETED | OUTPATIENT
Start: 2022-11-15 | End: 2022-11-15

## 2022-11-15 RX ORDER — ROPIVACAINE HYDROCHLORIDE 2 MG/ML
INJECTION, SOLUTION EPIDURAL; INFILTRATION; PERINEURAL
Status: COMPLETED
Start: 2022-11-15 | End: 2022-11-15

## 2022-11-15 RX ORDER — KETOROLAC TROMETHAMINE 30 MG/ML
30 INJECTION, SOLUTION INTRAMUSCULAR; INTRAVENOUS EVERY 6 HOURS
Status: COMPLETED | OUTPATIENT
Start: 2022-11-16 | End: 2022-11-16

## 2022-11-15 RX ORDER — SENNA AND DOCUSATE SODIUM 50; 8.6 MG/1; MG/1
1 TABLET, FILM COATED ORAL DAILY
Status: DISCONTINUED | OUTPATIENT
Start: 2022-11-16 | End: 2022-11-18 | Stop reason: HOSPADM

## 2022-11-15 RX ORDER — NALOXONE HYDROCHLORIDE 0.4 MG/ML
INJECTION, SOLUTION INTRAMUSCULAR; INTRAVENOUS; SUBCUTANEOUS PRN
Status: DISCONTINUED | OUTPATIENT
Start: 2022-11-15 | End: 2022-11-18 | Stop reason: HOSPADM

## 2022-11-15 RX ORDER — ONDANSETRON 2 MG/ML
INJECTION INTRAMUSCULAR; INTRAVENOUS PRN
Status: DISCONTINUED | OUTPATIENT
Start: 2022-11-15 | End: 2022-11-15 | Stop reason: SDUPTHER

## 2022-11-15 RX ORDER — ROPIVACAINE HYDROCHLORIDE 2 MG/ML
INJECTION, SOLUTION EPIDURAL; INFILTRATION; PERINEURAL PRN
Status: DISCONTINUED | OUTPATIENT
Start: 2022-11-15 | End: 2022-11-15 | Stop reason: SDUPTHER

## 2022-11-15 RX ORDER — ENOXAPARIN SODIUM 100 MG/ML
40 INJECTION SUBCUTANEOUS DAILY
Status: DISCONTINUED | OUTPATIENT
Start: 2022-11-16 | End: 2022-11-18 | Stop reason: HOSPADM

## 2022-11-15 RX ORDER — ONDANSETRON 4 MG/1
4 TABLET, ORALLY DISINTEGRATING ORAL EVERY 8 HOURS PRN
Qty: 30 TABLET | Refills: 1 | Status: SHIPPED | OUTPATIENT
Start: 2022-11-15

## 2022-11-15 RX ORDER — LIDOCAINE HYDROCHLORIDE AND EPINEPHRINE 15; 5 MG/ML; UG/ML
INJECTION, SOLUTION EPIDURAL PRN
Status: DISCONTINUED | OUTPATIENT
Start: 2022-11-15 | End: 2022-11-15 | Stop reason: SDUPTHER

## 2022-11-15 RX ORDER — ONDANSETRON 2 MG/ML
4 INJECTION INTRAMUSCULAR; INTRAVENOUS EVERY 6 HOURS PRN
Status: DISCONTINUED | OUTPATIENT
Start: 2022-11-15 | End: 2022-11-18 | Stop reason: HOSPADM

## 2022-11-15 RX ORDER — KETOROLAC TROMETHAMINE 30 MG/ML
INJECTION, SOLUTION INTRAMUSCULAR; INTRAVENOUS
Status: COMPLETED
Start: 2022-11-15 | End: 2022-11-15

## 2022-11-15 RX ORDER — CEPHALEXIN 500 MG/1
500 CAPSULE ORAL EVERY 8 HOURS SCHEDULED
Status: DISPENSED | OUTPATIENT
Start: 2022-11-16 | End: 2022-11-18

## 2022-11-15 RX ORDER — MORPHINE SULFATE 1 MG/ML
INJECTION, SOLUTION EPIDURAL; INTRATHECAL; INTRAVENOUS PRN
Status: DISCONTINUED | OUTPATIENT
Start: 2022-11-15 | End: 2022-11-15 | Stop reason: SDUPTHER

## 2022-11-15 RX ADMIN — MAGNESIUM SULFATE HEPTAHYDRATE 2000 MG/HR: 40 INJECTION, SOLUTION INTRAVENOUS at 22:07

## 2022-11-15 RX ADMIN — LIDOCAINE HYDROCHLORIDE,EPINEPHRINE BITARTRATE 3 ML: 15; .005 INJECTION, SOLUTION EPIDURAL; INFILTRATION; INTRACAUDAL; PERINEURAL at 10:34

## 2022-11-15 RX ADMIN — Medication 2000 MG: at 16:36

## 2022-11-15 RX ADMIN — PHENYLEPHRINE HYDROCHLORIDE 100 MCG: 10 INJECTION INTRAVENOUS at 17:03

## 2022-11-15 RX ADMIN — KETOROLAC TROMETHAMINE 30 MG: 30 INJECTION, SOLUTION INTRAMUSCULAR at 18:30

## 2022-11-15 RX ADMIN — Medication 40 MG: at 04:27

## 2022-11-15 RX ADMIN — MORPHINE SULFATE 2 MG: 1 INJECTION, SOLUTION EPIDURAL; INTRATHECAL; INTRAVENOUS at 17:12

## 2022-11-15 RX ADMIN — SODIUM CHLORIDE, POTASSIUM CHLORIDE, SODIUM LACTATE AND CALCIUM CHLORIDE: 600; 310; 30; 20 INJECTION, SOLUTION INTRAVENOUS at 17:30

## 2022-11-15 RX ADMIN — PHENYLEPHRINE HYDROCHLORIDE 100 MCG: 10 INJECTION INTRAVENOUS at 17:13

## 2022-11-15 RX ADMIN — SODIUM CHLORIDE, PRESERVATIVE FREE 10 ML: 5 INJECTION INTRAVENOUS at 08:40

## 2022-11-15 RX ADMIN — SODIUM CHLORIDE, POTASSIUM CHLORIDE, SODIUM LACTATE AND CALCIUM CHLORIDE 250 ML: 600; 310; 30; 20 INJECTION, SOLUTION INTRAVENOUS at 09:13

## 2022-11-15 RX ADMIN — ONDANSETRON 4 MG: 2 INJECTION INTRAMUSCULAR; INTRAVENOUS at 17:02

## 2022-11-15 RX ADMIN — SODIUM BICARBONATE 1 MEQ: 84 INJECTION, SOLUTION INTRAVENOUS at 16:42

## 2022-11-15 RX ADMIN — ROPIVACAINE HYDROCHLORIDE 6 ML: 2 INJECTION, SOLUTION EPIDURAL; INFILTRATION; PERINEURAL at 10:42

## 2022-11-15 RX ADMIN — SODIUM CHLORIDE, PRESERVATIVE FREE 10 ML: 5 INJECTION INTRAVENOUS at 14:35

## 2022-11-15 RX ADMIN — SODIUM CHLORIDE, POTASSIUM CHLORIDE, SODIUM LACTATE AND CALCIUM CHLORIDE 500 ML: 600; 310; 30; 20 INJECTION, SOLUTION INTRAVENOUS at 13:46

## 2022-11-15 RX ADMIN — ACETAMINOPHEN 1000 MG: 500 TABLET ORAL at 03:48

## 2022-11-15 RX ADMIN — NIFEDIPINE 30 MG: 30 TABLET, FILM COATED, EXTENDED RELEASE ORAL at 19:50

## 2022-11-15 RX ADMIN — LIDOCAINE HYDROCHLORIDE 4 ML: 20 INJECTION, SOLUTION EPIDURAL; INFILTRATION; INTRACAUDAL; PERINEURAL at 16:45

## 2022-11-15 RX ADMIN — Medication 20 MG: at 21:25

## 2022-11-15 RX ADMIN — PHENYLEPHRINE HYDROCHLORIDE 100 MCG: 10 INJECTION INTRAVENOUS at 16:55

## 2022-11-15 RX ADMIN — MAGNESIUM SULFATE HEPTAHYDRATE 2000 MG/HR: 40 INJECTION, SOLUTION INTRAVENOUS at 09:17

## 2022-11-15 RX ADMIN — DIPHENHYDRAMINE HCL 25 MG: 25 TABLET ORAL at 03:48

## 2022-11-15 RX ADMIN — Medication 20 MG: at 14:33

## 2022-11-15 RX ADMIN — PHENYLEPHRINE HYDROCHLORIDE 100 MCG: 10 INJECTION INTRAVENOUS at 16:58

## 2022-11-15 RX ADMIN — SODIUM CHLORIDE, POTASSIUM CHLORIDE, SODIUM LACTATE AND CALCIUM CHLORIDE: 600; 310; 30; 20 INJECTION, SOLUTION INTRAVENOUS at 16:52

## 2022-11-15 RX ADMIN — NIFEDIPINE 30 MG: 30 TABLET, FILM COATED, EXTENDED RELEASE ORAL at 03:12

## 2022-11-15 RX ADMIN — HYDRALAZINE HYDROCHLORIDE 10 MG: 20 INJECTION INTRAMUSCULAR; INTRAVENOUS at 07:37

## 2022-11-15 RX ADMIN — ONDANSETRON 4 MG: 2 INJECTION INTRAMUSCULAR; INTRAVENOUS at 08:52

## 2022-11-15 RX ADMIN — SODIUM BICARBONATE 1 MEQ: 84 INJECTION, SOLUTION INTRAVENOUS at 16:45

## 2022-11-15 RX ADMIN — Medication 909 ML/HR: at 16:56

## 2022-11-15 RX ADMIN — SODIUM CHLORIDE, PRESERVATIVE FREE 5 ML: 5 INJECTION INTRAVENOUS at 11:37

## 2022-11-15 RX ADMIN — LIDOCAINE HYDROCHLORIDE 2 ML: 20 INJECTION, SOLUTION EPIDURAL; INFILTRATION; INTRACAUDAL; PERINEURAL at 16:59

## 2022-11-15 RX ADMIN — TRANEXAMIC ACID 1000 MG: 10 INJECTION, SOLUTION INTRAVENOUS at 19:00

## 2022-11-15 RX ADMIN — LIDOCAINE HYDROCHLORIDE 10 ML: 20 INJECTION, SOLUTION EPIDURAL; INFILTRATION; INTRACAUDAL; PERINEURAL at 16:42

## 2022-11-15 RX ADMIN — Medication 500 MG: at 16:51

## 2022-11-15 RX ADMIN — Medication 20 MG: at 03:03

## 2022-11-15 RX ADMIN — MORPHINE SULFATE 3 MG: 1 INJECTION, SOLUTION EPIDURAL; INTRATHECAL; INTRAVENOUS at 17:04

## 2022-11-15 RX ADMIN — DEXAMETHASONE SODIUM PHOSPHATE 10 MG: 10 INJECTION INTRAMUSCULAR; INTRAVENOUS at 17:02

## 2022-11-15 RX ADMIN — LIDOCAINE HYDROCHLORIDE,EPINEPHRINE BITARTRATE 2 ML: 15; .005 INJECTION, SOLUTION EPIDURAL; INFILTRATION; INTRACAUDAL; PERINEURAL at 10:37

## 2022-11-15 RX ADMIN — ROPIVACAINE HYDROCHLORIDE 8 ML/HR: 2 INJECTION, SOLUTION EPIDURAL; INFILTRATION at 10:41

## 2022-11-15 RX ADMIN — HYDRALAZINE HYDROCHLORIDE 10 MG: 20 INJECTION INTRAMUSCULAR; INTRAVENOUS at 08:06

## 2022-11-15 ASSESSMENT — PAIN DESCRIPTION - LOCATION: LOCATION: ABDOMEN

## 2022-11-15 ASSESSMENT — PAIN SCALES - GENERAL
PAINLEVEL_OUTOF10: 4
PAINLEVEL_OUTOF10: 4

## 2022-11-15 ASSESSMENT — PAIN DESCRIPTION - DESCRIPTORS: DESCRIPTORS: CRAMPING

## 2022-11-15 ASSESSMENT — PAIN DESCRIPTION - ORIENTATION: ORIENTATION: MID;LOWER

## 2022-11-15 NOTE — PROGRESS NOTES
Labor Progress Note    Gm Melendez is a 28 y.o. female  at 37w11d  The patient was seen and examined. Her pain is well controlled. She reports fetal movement is present, denies contractions, denies loss of fluid, denies vaginal bleeding. Cervidil was placed. Patient tolerated well.      Vital Signs:  Vitals:    22 2333 22 2340 22 2350 11/15/22 0000   BP: (!) 151/92 (!) 149/90 (!) 141/90 (!) 150/89   Pulse: 100 99 93 96   Resp:       Temp:       TempSrc:       SpO2: 96% 96% 96% 96%         FHT:  135 , moderate variability, accelerations present, decelerations absent  Contractions: intermittent contractions    Chaperone for Intimate Exam: Chaperone was present for entire exam, Chaperone Name: Ron GRIFFIN  Cervical Exam: 1 cm dilated, 60 effaced, -1 station    Membranes: Intact  Scalp Electrode in place: absent  Intrauterine Pressure Catheter in Place: absent    Interventions: SVE and Cervidil placement    Assessment/Plan:  Gm Melendez is a 28 y.o. female  at 37w11d admitted for RR IOL   - GBS negative, No indication for GBS prophylaxis   - BP elevated non severe, other VSS   - cEFM/TOCO: cat 1   - Cervidil placed   - Continue current management    Severe gHTN   - new dx   - Patient had multiple severe range blood pressures   - Denies s/s of PreE   - Mag started   - s/p labetalol 20 mg IV x1   - Mag levels q 6        Attending updated and in agreement with plan    Ranjit Mcguire MD  Ob/Gyn Resident  11/15/2022, 12:01 AM

## 2022-11-15 NOTE — PROGRESS NOTES
Labor Progress Note    Gm Melendez is a 28 y.o. female  at 37w11d  The patient was seen and examined. Her pain is well controlled. She reports fetal movement is present, complains of contractions, complains of loss of fluid, denies vaginal bleeding. Attempted to insert IUPC at this time. Patient did not tolerate IUPC placement. Procedure abandoned at this time.      Vital Signs:  Vitals:    11/15/22 1043 11/15/22 1045 11/15/22 1051 11/15/22 1100   BP: (!) 147/72   123/61   Pulse: 99   97   Resp:  16 16 18   Temp:       TempSrc:       SpO2:    93%         FHT: 120, moderate variability, accelerations absent, decelerations variable present, responded to resuscitation   Contractions: irregular, every 4-5 minutes    Chaperone for Intimate Exam: Chaperone was present for entire exam, Chaperone Name: Jimi Philippe RN  Cervical Exam: 6 cm dilated, 90 effaced, 0 station  Pitocin: @ 6 mu/min    Membranes: Ruptured clear fluid  Scalp Electrode in place: absent  Intrauterine Pressure Catheter in Place: absent    Interventions: none    Assessment/Plan:  Gm Melendez is a 28 y.o. female  at 37w11d admitted for Risk Reducing Induction of Labor   - GBS negative, No indication for GBS prophylaxis   - Vital signs stable at this time , Afebrile    - cEFM/TOCO    - SROM @ 0900    - S/p Cervidil x1    - Epidural in place    - SVE: /0   - Continue pitocin per protocol    - Will continue to monitor     Preeclampsia with Severe Features (BP)   - BP normotensive at this time    - Patient denies sxs PreE at this time    - Patient on magnesium until 24hr PP   - Next mag note @ 1700   - Mag levels q6 hrs- most recent level 5.3; next @ 1745  - Procardia 30 XL daily - started 11/15/22   - PreE labs wnl, P/C 1.86 ()   - S/p hydralazine 10 mg x2, last @ 0806   - S/p labetalol 20mg IV x2, 40 x1 last @0427  - Will continue to monitor     Anemia   - Hgb on admission 9.4    - Vitals signs stable    - Patient denies sxs anemia - Plan to discharge patient with rx for iron    - Will continue to monitor     BMI 39    Senior Resident updated and in agreement with plan    Austin Bustamante DO  Ob/Gyn Resident  11/15/2022, 1:15 PM

## 2022-11-15 NOTE — H&P
OBSTETRICAL HISTORY [facility]    Date: 11/15/2022       Time: 12:06 AM   Patient Name: Leonard Miles     Patient : 1995  Room/Bed: 8085/5472-40    Admission Date/Time: 2022 10:03 PM      CC: RR IOL     HPI: Leonard Miles is a 28 y.o.  at 39w6d who presents for a RR IOL. Patient denies any fever, chills, N/V, headaches, vision changes, chest pain, shortness of breath, RUQ pain, abdominal pain, and increased swelling/tenderness in bilateral lower extremities. Patient denies any vaginal discharge and any urinary complaints. The patient reports fetal movement is present, denies contractions, denies loss of fluid, denies vaginal bleeding. DATING:  LMP: Patient's last menstrual period was 12/15/2021 (approximate).   Estimated Date of Delivery: 22   Based on: ultrasound, at 19 6/7 weeks GA    PREGNANCY RISK FACTORS:  Patient Active Problem List   Diagnosis    Lactose intolerance    Genital warts    Late prenatal care    H/O pre-eclampsia w/ SF    History of prior pregnancy with IUGR     Noncompliant pregnant patient in third trimester    39 weeks gestation of pregnancy        Steroids Given In This Pregnancy:  no     REVIEW OF SYSTEMS:   Constitutional: negative fever, negative chills, negative weight changes   HEENT: negative visual disturbances, negative headaches, negative dizziness, negative hearing loss  Breast: Negative breast abnormalities, negative breast lumps, negative nipple discharge  Respiratory: negative dyspnea, negative cough, negative SOB  Cardiovascular: negative chest pain,  negative palpitations, negative arrhythmia, negative syncope   Gastrointestinal: negative abdominal pain, negative RUQ pain, negative N/V, negative diarrhea, negative constipation, negative bowel changes, negative heartburn   Genitourinary: negative dysuria, negative hematuria, negative urinary incontinence, negative vaginal discharge, negative vaginal bleeding or spotting  Dermatological: negative rash, negative pruritis, negative mole or other skin changes  Hematologic: negative bruising  Immunologic/Lymphatic: negative recent illness, negative recent sick contact  Musculoskeletal: negative back pain, negative myalgias, negative arthralgias  Neurological:  negative dizziness, negative migraines, negative seizures, negative weakness  Behavior/Psych: negative depression, negative anxiety, negative SI, negative HI      OBSTETRIC HISTORY:   OB History    Para Term  AB Living   2 1 1 0 0 1   SAB IAB Ectopic Molar Multiple Live Births   0 0 0 0 0 1      # Outcome Date GA Lbr Wilber/2nd Weight Sex Delivery Anes PTL Lv   2 Current            1 Term 18 38w0d / 01:00 5 lb 13.7 oz (2.655 kg) M Vag-Spont  N JEANNETTE      Name: Tank Rollins: 9  Apgar5: 9       PAST MEDICAL HISTORY:   has a past medical history of Hypertension. PAST SURGICAL HISTORY:   has a past surgical history that includes Colposcopy. ALLERGIES:  has No Known Allergies. MEDICATIONS:  Prior to Admission medications    Medication Sig Start Date End Date Taking? Authorizing Provider   Prenatal MV & Min w/FA-DHA (PRENATAL GUMMIES) 0.18-25 MG CHEW Take 1 tablet by mouth daily    Historical Provider, MD       FAMILY HISTORY:  family history includes Anxiety Disorder in her sister; Arthritis in her mother; Asthma in her brother and mother; Heart Surgery in her maternal grandmother; Hypertension in her mother; Lupus in her mother. SOCIAL HISTORY:   reports that she has quit smoking. She has never used smokeless tobacco. She reports that she does not currently use alcohol. She reports that she does not use drugs.     VITALS:  Vitals:    22 2333 22 2340 22 2350 11/15/22 0000   BP: (!) 151/92 (!) 149/90 (!) 141/90 (!) 150/89   Pulse: 100 99 93 96   Resp:       Temp:       TempSrc:       SpO2: 96% 96% 96% 96%         PHYSICAL EXAM:  Fetal Heart Monitor: Baseline Heart Rate 135, moderate variability, present accelerations, absent decelerations  [place]: intermittent contractions    General appearance:  no apparent distress, alert, and cooperative  HEENT: head atraumatic, normocephalic, moist mucous membranes, trachea midline  Neurologic:  alert, oriented, normal speech, no focal findings or movement disorder noted  Lungs:  No increased work of breathing, good air exchange, clear to auscultation bilaterally, no crackles or wheezing  Heart:  regular rate and rhythm and no murmur    Abdomen:  soft, gravid, and non-tender  Extremities:  no calf tenderness, non edematous  Musculoskeletal: Gross strength equal and intact throughout, no gross abnormalities, range of motion normal in hips, knees, shoulders and spine  Psychiatric: Mood appropriate, normal affect   Rectal Exam: not indicated  Pelvic Exam:  Chaperone for Intimate Exam: Chaperone was present for entire exam, Chaperone Name: Liana Goodell RN  Sterile Speculum Exam:   Urethral meatus: normal appearing   Vulva: Normal hair distribution, normal appearing vulva, no masses, tenderness or lesions, normal clitoris   Vagina: Normal appearing vaginal mucosa without lesions, white vaginal discharge noted in the posterior vault, no lacerations   Cervix: Normal appearing cervix without lesions, no lacerations or abnormal lesions visualized  Sterile Vaginal Exam:  Cervix: No cervical motion tenderness   Uterus: Is gravid, Normal size, shape, consistency and non-tender   Adnexa: Non-tender, no palpable masses  Cervix: 1 cm dilated, 60 % effaced, -1 station    LIMITED BEDSIDE US:  Position: Cephalic  Placental Location: posterior  Fetal Heart Tones: Present  Fetal Movement: Present  Amniotic Fluid Index/Volume: > 2x2 cm pocket  Estimated Fetal Weight:  6 lbs 15 oz    PRENATAL LAB RESULTS:  Blood Type/Rh: O pos  Antibody Screen: negative  Hemoglobin, Hematocrit, Platelets: 71.7/05.2/691  Rubella: immune  T.  Pallidum, IgG: non-reactive  Hepatitis B Surface Antigen: non-reactive   Hepatitis C Antibody: not done   HIV: non-reactive   Gonorrhea: negative  Chlamydia: negative  Urine culture: not available    Early 1 hour Glucose Tolerance Test: not done    Group B Strep: negative on 22  Cystic Fibrosis Screen: negative  Sickle Cell Screen: negative  msAFP: not done  Non-Invasive Prenatal Testing: low risk for aneuploidy  Anatomy US: posterior placenta, 3 VC, male gender, normal anatomy    ASSESSMENT & PLAN:  Refugio Lam is a 28 y.o. female  at 37w11d IUP here for a RR IOL   - GBS negative / Rh positive / R immune   - No indication for GBS prophylaxis   - VSS, BP elevated severe range  - one elevated non severe range BP noted in office   - admit to L&D under service of Dr. Prashant Elizabeth   - CBC, T&S, TGracielapal, UDS pending     - IV fluids: LR @ 125 cc/hr   - Induction method: Cervidil   - CEFM/ TOCO: cat 1   - Diet: normal  - UDS ordered, Informed consent obtained. Discussed R/B/A. All questions and concerns answered. Patient verbalized understanding and agreement to plan.      Elevated Severe Range BP x1    - Repeat BP was elevated severe range   - Repeat 15 mins later was elevated severe range   - Patient qualifies as having a new dx of severe gestational hypertension   - Patient given labetalol 20 mg IV x1   - Patient denies s/s of PreE   - PreE labs and P/C ordered   - Magnesium protocol started   - Continue to monitor    Hx Pre E   - Patient had a history of PreE in G1    Genital Warts   - Patient endorses remote history of genital warts   - Denies prodromal sx or signs of warts in this pregnancy   - Patient states she did not take suppressive medication   - SSE performed and no signs of warts noted   - Patient cleared for vaginal delivery    Hx IUGR   - In G1    Alpha Thal Silent Carrier   - Patient follows with [facility]   - States FOB was not tested but they are not concerned at this time    Late Prenatal Care   - Patient counseled on importance of consistent prenatal care in pregnancy    BMI 39      Patient Active Problem List    Diagnosis Date Noted    History of prior pregnancy with IUGR  2022     Priority: High    Late prenatal care 2022     Priority: High     Started at 23 weeks      H/O pre-eclampsia w/ SF 2022     Priority: High    Genital warts 2022     Priority: High    Noncompliant pregnant patient in third trimester 2022     Priority: Medium    39 weeks gestation of pregnancy 2022     Priority: Medium    Lactose intolerance 10/31/2017       Plan discussed with Dr. Taco Young, who is agreeable. Steroids given this admission: No    Risks, benefits, alternatives and possible complications have been discussed in detail with the patient. Admission, and post admission procedures and expectations were discussed in detail. All questions were answered.     Attending's Name: Dr. Jules Gordon MD  Ob/Gyn Resident  11/15/2022, 12:06 AM

## 2022-11-15 NOTE — ANESTHESIA PROCEDURE NOTES
Epidural Block    Patient location during procedure: OB  Start time: 11/15/2022 10:30 AM  End time: 11/15/2022 10:40 AM  Reason for block: labor epidural  Staffing  Performed: resident/CRNA   Anesthesiologist: Kirk Barnes MD  Resident/CRNA: BISMARK Duncan CRNA  Epidural  Patient position: sitting  Prep: Betasept and site prepped and draped  Patient monitoring: continuous pulse ox and frequent blood pressure checks  Approach: midline  Location: L3-4  Injection technique: MINH saline and MINH air  Provider prep: mask and sterile gloves  Needle  Needle type: Tuohy   Needle gauge: 17 G  Needle length: 3.5 in  Needle insertion depth: 7.5 cm  Catheter type: side hole  Catheter size: 19 G  Catheter at skin depth: 15 cm  Test dose: negativeCatheter Secured: tegaderm and tape  Assessment  Hemodynamics: stable  Attempts: 1  Outcomes: uncomplicated and patient tolerated procedure well  Preanesthetic Checklist  Completed: patient identified, IV checked, site marked, risks and benefits discussed, surgical/procedural consents, equipment checked, pre-op evaluation, timeout performed, anesthesia consent given, oxygen available, monitors applied/VS acknowledged, fire risk safety assessment completed and verbalized and blood product R/B/A discussed and consented

## 2022-11-15 NOTE — PROGRESS NOTES
Resident Interval Magnesium Sulfate Note    Rosey Alpers is a 28 y.o. female  admitted for IOL 2/2 PreE w/ SF (BP)  The patient is resting comfortably. She has been nauseous, had one bout of emesis. She denies headache, visual changes, and abdominal pain in the right upper quadrant. She denies any shortness of breath or chest pain. She denies change in her extremities, regarding swelling.     Continuous Medications:    lactated ringers 75 mL/hr at 11/15/22 4360    sodium chloride      sodium chloride      magnesium sulfate 2,000 mg/hr (11/15/22 0712)       Vitals:    Vitals:    11/15/22 0600 11/15/22 0615 11/15/22 0630 11/15/22 0700   BP: (!) 149/95 (!) 148/95 (!) 152/100 (!) 165/98   Pulse: (!) 103 93 96 96   Resp: 17 17 17 17   Temp:       TempSrc:       SpO2: 97% 93% 93% 93%         Physical Exam:  Chest: clear to auscultation bilaterally  Heart: RRR no murmur  Abdomen: soft, nontender, nondistended  Extremities: DTR normal Bilateral lower extremities Right: 2/4   Left: 2/4  Clonus: absent    Urine Output: 200 mL/hr; Clear urine    Labs:  Last Magnesium Level:   Lab Results   Component Value Date/Time    MG 4.4 11/15/2022 05:01 AM       BMP:    Recent Labs     22  2312      K 4.6      CO2 21   BUN 10   CREATININE 0.61   GLUCOSE 92       ASSESSMENT/PLAN  Rosey Alpers is a 28 y.o. female  admitted for IOL 2/2 PreE w/ SF (BP)   - Continue Magnesium Sulfate Treatment 2g/hr, off @ 24 hours PP   -  Mag levels q6hrs, next @ 1100   - BPs now stable   - Patient denies any s/s PreE   - UOP adequate   - PreE labs wnl, P/C 1.86 ()   - Currently controlled on Procardia 30mg XL QD (started 11/15)   - Last IV anti-hypertensive: Hydralazine 10mg @ 0806   - Continue to monitor closely    David Huerta DO  Ob/Gyn Resident  11/15/2022, 8:43 AM

## 2022-11-15 NOTE — PROGRESS NOTES
Labor Progress Note    Georgie Rodriguez is a 28 y.o. female  at 37w11d  The patient was seen and examined. Her pain is well controlled. She reports fetal movement is present, complains of contractions, denies loss of fluid, denies vaginal bleeding.        Vital Signs:  Vitals:    11/15/22 0600 11/15/22 0615 11/15/22 0630 11/15/22 0700   BP: (!) 149/95 (!) 148/95 (!) 152/100 (!) 165/98   Pulse: (!) 103 93 96 96   Resp: 17 17 17 17   Temp:       TempSrc:       SpO2: 97% 93% 93% 93%         FHT: 130, moderate variability, accelerations present, decelerations absent  Contractions: regular, every 1-2 minutes    Chaperone for Intimate Exam: Chaperone was present for entire exam, Chaperone Name: Samy Cerda RN  Cervical Exam: 3/50/-1  Pitocin: @ 0 mu/min    Membranes: Intact  Scalp Electrode in place: absent  Intrauterine Pressure Catheter in Place: absent    Interventions: SVE performed, Cervidil removed    Assessment/Plan:  Georgie Rodriguez is a 28 y.o. female  at 37w11d admitted for IOL 2/2 PreE w/ SF (BP)   - GBS negative, No indication for GBS prophylaxis   - Bps elevated but stable, afebrile   - cEFM/TOCO   - S/p Cervidil   - Plan for pitocin per protocol once contractions space out; IVF bolus 250 cc ordered   - Continue to monitor closely      Attending updated and in agreement with plan    Loraine Saleh DO  Ob/Gyn Resident  11/15/2022, 9:02 AM

## 2022-11-15 NOTE — PROGRESS NOTES
NEGATIVE Final    Comment:       (Positive cutoff 300 ng/mL)                  Methadone Screen, Urine 11/15/2022 NEGATIVE  NEGATIVE Final    Comment:       (Positive cutoff 300 ng/mL)                  Opiates, Urine 11/15/2022 NEGATIVE  NEGATIVE Final    Comment:       (Positive cutoff 300 ng/mL)                  Phencyclidine, Urine 11/15/2022 NEGATIVE  NEGATIVE Final    Comment:       (Positive cutoff 25 ng/mL)                  Cannabinoid Scrn, Ur 11/15/2022 NEGATIVE  NEGATIVE Final    Comment:       (Positive cutoff 50 ng/mL)                  Oxycodone Screen, Ur 11/15/2022 NEGATIVE  NEGATIVE Final    Comment:       (Positive cutoff 100 ng/mL)                  Fentanyl, Ur 11/15/2022 NEGATIVE  NEGATIVE Final    Comment:       (Positive cutoff  5 ng/ml)            Test Information 11/15/2022 Assay provides medical screening only. The absence of expected drug(s) and/or metabolite(s) may indicate diluted or adulterated urine, limitations of testing or timing of collection. Final    Comment: Testing for legal purposes should be confirmed by another method. To request confirmation   of test result, please call the lab within 7 days of sample submission.       WBC 11/14/2022 9.5  3.5 - 11.3 k/uL Final    RBC 11/14/2022 3.90 (A)  3.95 - 5.11 m/uL Final    Hemoglobin 11/14/2022 9.4 (A)  11.9 - 15.1 g/dL Final    Hematocrit 11/14/2022 29.9 (A)  36.3 - 47.1 % Final    MCV 11/14/2022 76.7 (A)  82.6 - 102.9 fL Final    MCH 11/14/2022 24.1 (A)  25.2 - 33.5 pg Final    MCHC 11/14/2022 31.4  28.4 - 34.8 g/dL Final    RDW 11/14/2022 17.2 (A)  11.8 - 14.4 % Final    Platelets 11/62/3516 326  138 - 453 k/uL Final    MPV 11/14/2022 10.0  8.1 - 13.5 fL Final    NRBC Automated 11/14/2022 1.4 (A)  0.0 per 100 WBC Final    Seg Neutrophils 11/14/2022 64  36 - 65 % Final    Lymphocytes 11/14/2022 23 (A)  24 - 43 % Final    Monocytes 11/14/2022 10  3 - 12 % Final    Eosinophils % 11/14/2022 2  1 - 4 % Final    Basophils 11/14/2022 0 0 - 2 % Final    Immature Granulocytes 11/14/2022 1 (A)  0 % Final    Segs Absolute 11/14/2022 5.96  1.50 - 8.10 k/uL Final    Absolute Lymph # 11/14/2022 2.22  1.10 - 3.70 k/uL Final    Absolute Mono # 11/14/2022 0.98  0.10 - 1.20 k/uL Final    Absolute Eos # 11/14/2022 0.21  0.00 - 0.44 k/uL Final    Basophils Absolute 11/14/2022 0.03  0.00 - 0.20 k/uL Final    Absolute Immature Granulocyte 11/14/2022 0.09  0.00 - 0.30 k/uL Final    RBC Morphology 11/14/2022 ANISOCYTOSIS PRESENT   Final    MICROCYTOSIS PRESENT    Glucose 11/14/2022 92  70 - 99 mg/dL Final    BUN 11/14/2022 10  6 - 20 mg/dL Final    Creatinine 11/14/2022 0.61  0.50 - 0.90 mg/dL Final    Est, Glom Filt Rate 11/14/2022 >60  >60 mL/min/1.73m2 Final    Comment:       Effective Oct 3, 2022        These results are not intended for use in patients <25years of age. eGFR results are calculated without a race factor using the 2021 CKD-EPI equation. Careful clinical correlation is recommended, particularly when comparing to results   calculated using previous equations. The CKD-EPI equation is less accurate in patients with extremes of muscle mass, extra-renal   metabolism of creatine, excessive creatine ingestion, or following therapy that affects   renal tubular secretion.       Calcium 11/14/2022 9.2  8.6 - 10.4 mg/dL Final    Sodium 11/14/2022 139  135 - 144 mmol/L Final    Potassium 11/14/2022 4.6  3.7 - 5.3 mmol/L Final    Chloride 11/14/2022 106  98 - 107 mmol/L Final    CO2 11/14/2022 21  20 - 31 mmol/L Final    Anion Gap 11/14/2022 12  9 - 17 mmol/L Final    Alkaline Phosphatase 11/14/2022 650 (A)  35 - 104 U/L Final    ALT 11/14/2022 7  5 - 33 U/L Final    AST 11/14/2022 32 (A)  <32 U/L Final    Total Bilirubin 11/14/2022 0.3  0.3 - 1.2 mg/dL Final    Total Protein 11/14/2022 5.9 (A)  6.4 - 8.3 g/dL Final    Albumin 11/14/2022 3.0 (A)  3.5 - 5.2 g/dL Final    Albumin/Globulin Ratio 11/14/2022 1.0  1.0 - 2.5 Final    Total Protein, Urine 11/15/2022 222  mg/dL Final    No normal range established. Creatinine, Ur 11/15/2022 119.5  28.0 - 217.0 mg/dL Final    Urine Total Protein Creatinine Rat* 11/15/2022 1.86 (A)  0.00 - 0.20 Final    Magnesium 11/15/2022 4.4 (A)  1.6 - 2.6 mg/dL Final   ]    BP (!) 143/70   Pulse 98   Temp 98.4 °F (36.9 °C) (Oral)   Resp 16   LMP 12/15/2021 (Approximate)   SpO2 94%       Pelvic Exam:  Cervix Check:     DILATION:  3-4 cm   EFFACEMENT:   90%   STATION:  0 cm   CONSISTENCY:  soft   POSITION:  anterior    FETAL POSITION: Cephalic,     Assessment:  1. Ashley Diop is a 28 y.o. female  39w6d     2.   OB History    Para Term  AB Living   2 1 1     1   SAB IAB Ectopic Molar Multiple Live Births             1      # Outcome Date GA Lbr Wilber/2nd Weight Sex Delivery Anes PTL Lv   2 Current            1 Term 18 38w0d / 01:00 5 lb 13.7 oz (2.655 kg) M Vag-Spont  N JEANNETTE         3. 39 weeks gestation of pregnancy [Z3A.39]      4. Patient Active Problem List    Diagnosis Date Noted    Noncompliant pregnant patient in third trimester 2022     Priority: High    History of prior pregnancy with IUGR  2022     Priority: High    Late prenatal care 2022     Priority: High     Started at 19 weeks      Genital warts 2022     Priority: High    39 weeks gestation of pregnancy 2022     Priority: Medium    PreE w/ SF x2 (G1, G2) 11/15/2022     G2 pregnancy  Hx PreE in G1      Lactose intolerance 10/31/2017       5. IOL d/t pre-eclampsia w/ SF          Plan:   1. Continue with current care plan  2. MgSO4  3.  Pitocin per protocol        Electronically signed by Shaheen Joseph DO on 11/15/2022 at 10:06 AM

## 2022-11-15 NOTE — PROGRESS NOTES
Obstetric/Gynecology Resident Interval Note    Resident notified that patient had two severe range blood pressures 15 minutes apart. Decision made to give IV Labetalol 20mg at this time. Repeat blood pressure 10 minutes after labetalol was 144/89. Vitals:    11/15/22 1400 11/15/22 1405 11/15/22 1410 11/15/22 1415   BP: (!) 164/93   (!) 161/92   Pulse: (!) 101   99   Resp: 16   16   Temp:       TempSrc:       SpO2: 94% 93% 94% 93%     Will continue to monitor.      Austin Bustamante DO  OB/GYN Resident, PGY1  [facility]  11/15/2022, 2:22 PM

## 2022-11-15 NOTE — CARE COORDINATION
ANTEPARTUM NOTE    39 weeks gestation of pregnancy [Z3A.39]    Ashley Viera was admitted to L&D on 11/14/22 for RR IOL @ 39w6d    OB GYN Provider: Dr. Mary Mcdermott    Will meet with patient after delivery to verify name/address/phone/insurance and discuss discharge planning. Anticipate DC home 2 nights after vaginal delivery or 4 nights after C/S delivery as long as hemodynamically stable.

## 2022-11-15 NOTE — PROGRESS NOTES
Labor Progress Note  Resident Interval Magnesium Note    Bree Timmons is a 28 y.o. female  at 37w11d  The patient was seen and examined. The patient is resting comfortably. Her pain is well controlled. She reports fetal movement is present, denies contractions, denies loss of fluid, denies vaginal bleeding. She reports a 4/10 headache but hasn't tried any medications for it. Thinks it might also be allergy related. She deniesvisual changes and abdominal pain in the right upper quadrant. She denies any shortness of breath or chest pain. She denies change in her extremities, regarding swelling.     Continuous Medications:    lactated ringers 75 mL/hr at 22 2333    sodium chloride      sodium chloride      magnesium sulfate 2,000 mg/hr (22 2351)         Vital Signs:  Vitals:    11/15/22 0311 11/15/22 0320 11/15/22 0330 11/15/22 0347   BP: (!) 160/105 (!) 159/99 (!) 157/99 (!) 157/101   Pulse: 95 96 95 98   Resp:       Temp:       TempSrc:       SpO2: 97% 97% 97%            Physical Exam:  FHT: 130, moderate variability, accelerations present, decelerations absent  Contractions: intermittent contractions    Chaperone for Intimate Exam: deferred at this time    Chest: clear to auscultation bilaterally  Heart: RRR no murmur  Abdomen: soft, nontender, gravid, no s/s chorio or abruption  Extremities: DTR normal Right: 2/4   Left: 2/4  Clonus: absent    Urine Output: 66 mL/hr; Clear urine      Membranes: Intact  Scalp Electrode in place: absent  Intrauterine Pressure Catheter in Place: absent    Interventions: none    Labs:  Last Magnesium Level:   No results found for: MG    BMP:    Recent Labs     22  2312      K 4.6      CO2 21   BUN 10   CREATININE 0.61   GLUCOSE 92       Assessment/Plan:  Bree Timmons is a 28 y.o. female  at 37w11d admitted for RR IOL   - GBS negative, No indication for GBS prophylaxis   - BP elevated with occasional severe range blood pressures   - cEFM/TOCO cat 1   - Cervidil in place   - Continue current labor management    PreE with SF   - P/C returned at 1.86. Patient now qualifies as PreE with SF (BP)   - Patient endorses mild headache. Tylenol and Benadryl given.  Will continue to monitor headache   - Continue Magnesium Sulfate Treatment 2g/hr, off @ 24 hr PP   - Mag levels q6hrs per provider   - BPs elevated non severe   - Patient s/p labetalol 20 mg IV x2 (last 0303)   - Patient denies any other s/s PreE   - UOP    - Continue to monitor closely    Ulises Pat MD  Ob/Gyn Resident  11/15/2022, 3:42 AM

## 2022-11-15 NOTE — OP NOTE
[facility]  OBSTETRICAL  PHYSICIAN POST-OPERATIVE  NOTE:      Patient Name: Yissel Hendrickson  Patient : 1995  [facility]  Admission Date/Time: 2022 10:03 PM  Primary Care Physician: No primary care provider on file. MRN #: [id number]  [facility] #: [id number]        Date: 11/15/2022  Time: 5:38 PM        Pre-operative Diagnosis:   Yissel Hendrickson is a 28 y.o. female at 37w11d      Term pregnancy, Induced labor, Single fetus, and Pregnancy complicated by:    Patient Active Problem List    Diagnosis Date Noted    Noncompliant pregnant patient in third trimester 2022     Priority: High    History of prior pregnancy with IUGR  2022     Priority: High    Late prenatal care 2022     Priority: High     Overview Note:     Started at 19 weeks      Genital warts 2022     Priority: High    39 weeks gestation of pregnancy 2022     Priority: Medium    PreE w/ SF x2 (G1, G2) 11/15/2022     Overview Note:     G2 pregnancy  Hx PreE in G1      Lactose intolerance 10/31/2017       IUP@ 39w6d  See Problem List  3. Pre-eclampsia with severe features  4. Recurrent decelerations with mom inability to push due to maternal exhaustion  5. Occiput posterior presentation  Pt request for primary       Post-operative Diagnosis:    Living  infant(s) and Male  Same as Pre-Op  Occiput posterior  Meconium    Procedures:  1.  Section- primary : Low Cervical, Transverse    2. Abdominal Delivery of a Live Born     male        Surgeon:  Brien Brennan DO      Assistants:  [address] Ciara YOUSIF PGY3  2. [facility] Gabrielle Salcedo PGY1    OR Staff:  [employer]: Beronica Alfonso RN      Anesthesia:  epidural    Duramorph Utilized: Yes        Estimated blood loss:  see QBL ML    Fluids:     IV: See anesthesia record    Blood Products:  none   Cell Saver: No    Urine Output[de-identified]  50 ml (clear)    Drains:   TYPE: Castaneda  Urinary Catheter 11/15/22 Leonel (Active)         TRS Tissue Retention System Skin Retractor Utilized and placed under sterile conditions: No      Complications:  None      Findings/ Delivery Summary:  Mother's Information      Labor Events     Labor?: No  Cervical Ripening:   Now               Saba Viera [[id number]]      Labor Events     Labor?: No   Steroids?: None  Cervical Ripening Date/Time:     Cervical Ripening Type: Cervidil  Rupture Date/Time:  11:45:00   Rupture Type: SROM  Fluid Color: Clear  Fluid Odor: None  Fluid Volume: Scant  Induction: Cervidil  Labor Complications: Fetal Intolerance       Anesthesia    Method: Epidural       Start Pushing      Labor onset date/time:   Now     Dilation complete date/time: 11/15/22 15:37:00 EST Now     Start pushing date/time: 11/15/2022 15:57:00   Decision date/time (emergent ):           Delivery ()      Delivery Date/Time:  11/15/22 16:54:00   Delivery Type: , Low Transverse    Details:  Trial of Labor?: Yes    Categorization: Primary    Priority: Non-scheduled   Indications for : Fetal Intolerance of Labor   Skin Incision Type: Pfannenstiel     Uterine Incision: Low Transverse              Presentation    _: Occiput  _: Posterior       Shoulder Dystocia    Shoulder Dystocia Present?: No  Add Second Maneuver  Add Third Maneuver  Add Fourth Maneuver  Add Fifth Maneuver  Add Sixth Maneuver  Add Seventh Maneuver  Add Eighth Maneuver  Add Ninth Maneuver       Assisted Delivery Details    Forceps Attempted?: No  Vacuum Extractor Attempted?: No       Document Additional Attempt         Document Additional Attempt                 Cord    Vessels: 3 Vessels  Cord Clamped Date/Time: 11/15/2022 16:55:00  Cord Blood Disposition: Lab  Gases Sent?: Yes       Placenta    Date/Time: 11/15/2022 16:56:00  Removal: Manual Removal  Appearance: Intact  Disposition: Lab       Lacerations    Episiotomy: None  Perineal Lacerations: None  Other Lacerations: no non-perineal laceration       Vaginal Counts        Sponges Needles Instruments   Initial Counts      Final Counts      If the count is incorrect due to Intentionally Retained Foreign Object (IRFO) add the IRFO LDA in Lines/Drains. Add LDA: Link to [facility]       Blood Loss  Mother: Nelson An #[id number]     Start of Mother's Information      Delivery Blood Loss  11/15/22 1636 - 11/15/22 1738      None                 End of Mother's Information  Mother: Nelson An #[id number]                Delivery Providers    Delivering clinician: Kathy Alanis,      Provider Role    Kathy Alanis,  Obstetrician    Aníbal Shirley, RN Primary Nurse    Maine Herrera, RN Primary [place] Nurse    Jyoti Sharpe, RN NICU Nurse     Neonatologist     Anesthesiologist     Nurse Anesthetist    Lyubov Almeida, APRN - CNP Nurse Practitioner     Midwife     Nursery Nurse    Rakesh Liu, RN Primary Nurse    Kaitlyn Arevalo, DO Chief Resident    Hao Sharpe, DO Resident               Assessment    Living Status: Living  Delivery Location Comment: OR #2     Apgar Scoring Key:    0 1 2    Skin Color: Blue or pale Acrocyanotic Completely pink    Heart Rate: Absent <100 bpm >100 bpm    Reflex Irritability: No response Grimace Cry or active withdrawal    Muscle Tone: Limp Some flexion Active motion    Respiratory Effort: Absent Weak cry; hypoventilation Good, crying                      Skin Color:   Heart Rate:   Reflex Irritability:   Muscle Tone:   Respiratory Effort:    Total:            1 Minute:    0    2    2    2    2    8        Apgar 1 total from OB History    5 Minute:    1    2    2    2    2    9        Apgar 5 total from OB History    10 Minute:              15 Minute:              20 Minute:                        Apgars Assigned By: PER NICU TEAM              Resuscitation    Method: Bulb Suction, Room Air, Stimulation, Suctioning [place] Measurements      Birth Weight: 3825 g Birth Length: 0.502 m              Title      Skin to Skin Initiation Date/Time:       Skin to Skin End Date/Time:       Reason Skin to Skin Not Initiated: [place] Acuity                      Living  infant(s) and Male    Cephalic  occiput posterior  Other:       Amniotic Fluid was: Meconium  A Nuchal Cord: was not present x 0  A Spontaneous Cry Was Noted: Yes  The Baby: was suctioned        The Placenta Was Removed:  intact, whole, and that the umbilical cord had three vessels noted    cord gasses were obtained and sent to the lab, cord blood was obtained and sent to the lab, and Pitocin, 20 milliunits in 1 liter of ringers lactate was administered, wide open, to assist with uterine contraction    The umbilical cord had delayed clamping of 1 minute: Yes    The Maternal Adnexa was Visualized. There were not any adnexal masses. There is no height or weight on file to calculate BMI. (Wound Vac indicated for BMI Value>35)    Wound Vac Applied to Incision: Yes      Specimen:  Placenta sent to pathology yes  * No specimens in log *     Condition:  infant stable to general nursery and mother stable    Blood Type and Rh: O POSITIVE        Rubella Immunity Status:    Rubella Antibody, IgG   Date Value Ref Range Status   2022 418.5 IU/mL Final     Comment:                 REFERENCE RANGE:  <5.0       NON-REACTIVE (non-immune)  5.0 TO 9.9 EQUIVOCAL  >=10.0     REACTIVE     (immune)                 Infant Feeding:    bottle     Circumcision: Yes    All Sponge Counts Were Correct x 3 calls-Prior to closure of Peritoneum, Fascia, and Skin Layers: Yes        Attending Attestation: I was present and scrubbed for the entire procedure.     SCIP will be utilized for Antibiotics: yes  Allergies as of 2022    (No Known Allergies)         EPC's in  Place for DVT prophylaxis      Procedure: (Understanding of limitations from template op-reports exist)  Neda Rubio Elli Simpson is a 28 y.o. female  @ 39w6d for  delivery. The risks, benefits, complications, alternative treatment options, and expected outcomes were discussed with the patient. Risks of surgery were discussed, including but not limited to: pain, bleeding, need for blood transfusion, infection, injury to internal organs including intestines, bladder, uterus, fallopian tubes, ovaries and rarely injury to the fetus. Postoperative complications including pain, bleeding, need for blood transfusion, infection, re-operation, infection of the incisions were also explained. The patient verbalized understanding and agreed to proceed, giving informed consent. The patient was taken to Operating Room, identified as Gino Toro and the procedure verified as  Delivery. A Time Out was held and the above information confirmed. Procedure Details:  After epidural was redosed while  the patient was in supine position with a wedge placed under her right hip. FHT's were obtained, the patient was prepped and draped in the usual sterile manner. Castaneda catheter and vaginal prep was performed. Fetal pillow was inserted and 80 ml of sterile water was instilled. A three minute drape delay was completed. The bladder was draining clear urine. SCIP antibiotics were infused, EPC's were in place and operating. A time out was completed. There was an adequate skin check both high and low. A Pfannenstiel incision was made with a #10 scalpel and carried down to the fascia with bovie cautery. Fascial incision was made and extended transversely. The fascia was  from the underlying rectus tissue superiorly and inferiorly. The peritoneum was identified and entered superiorly. The peritoneal incision was extended superiorly and inferiorly, care not to involve the bowel or the bladder. The Katie SARA retractor was placed inferiorly into the incision.  The vesico-uterine reflection was developed and skeletonized off the lower uterine segment with blunt and sharp dissection. The [facility] retractor was reapproximated. A low transverse uterine incision was made and the uterine cavity was entered with extreme caution with the blunt end of the scalpel. This incision was extended using digital dissection in a cephalad to caudad manner. A live male infant was delivered without complications. The head was delivered through the incision and fundal pressure was used for the remaining body of the . There was not a nuchal cord. The  had bulb suction of the mouth and nares. There was a spontaneous cry. The infant was vigorous  and there was delayed cord clamping of 1 minute. Please find the  Apgar scores and weight above in the delivery summary. The umbilical cord was then clamped and cut, the infant was handed off to the awaiting neonatology team staff member attending the delivery. Then cord specimen and cord blood was collected and the placenta was delivered using gentle traction and fundal massage, (Spontaneously), it was intact and appeared normal.  The uterus was cleared of all clots and debris and was firm and contracted. IV Pitocin was infusing. An outflow tract was confirmed. The uterine incision was closed with running locked sutures of 0 Vicryl, starting at each corner with figure of \"8's\" and moving to the midline. Excellent hemostasis was observed. Gutters were cleared of all clots and debris. The pelvis was irrigated with warm, sterile water. Uterine incision was reinspected and hemostatic. The uterus, bilateral tubes and ovaries were normal.   The peritoneum was closed with 2-0 vicryl. The fascia was then reapproximated with running sutures of 0 Vicryl, starting at each corner and moving to the midline. It was checked for any defects and there were none. The subcutaneous tissue was irrigated, any bleeding sites were cauterized.   The subcuticular space was not infiltrated with 0.5% Plain marcaine to limit breakthrough post operative pain. The skin was closed in a subcuticular fashion with 4-0 vicryl, then covered with tincture of benzoin and steri-strips,  It was dressed with Prevena wound vac. Sponge, Needle and instrument counts were called for and found to be correct prior to closure of the peritoneum, fascia, and skin layers. The urine was clear in the tubing and the bag at the end of the procedure. The patient received preoperative antibiotics and intraoperative analgesic. EPC's were in place at the beginning of the case. Patient was returned to her LDRP in stable condition. See orders.           Attending's Name: Natasha Mcarthur DO      Physician Completing Document: Natasha Mcarthur DO    Electronically signed by Natasha Mcarthur DO on 11/15/2022 at 5:38 PM

## 2022-11-15 NOTE — PROGRESS NOTES
Resident Interval Magnesium Sulfate Note    Vijay Luciano is a 28 y.o. female  admitted for IOL 2/2 PreE w/ SF (BP)  The patient is resting comfortably. She denies headache, visual changes, and abdominal pain in the right upper quadrant. She denies any shortness of breath or chest pain. She denies change in her extremities, regarding swelling.     Continuous Medications:    oxytocin      ropivacaine 0.2% in sodium chloride 0.9% (OB) 8 mL/hr (11/15/22 1041)    lactated ringers 75 mL/hr at 11/15/22 7099    sodium chloride      sodium chloride      magnesium sulfate 2,000 mg/hr (11/15/22 0917)       Vitals:    Vitals:    11/15/22 1043 11/15/22 1045 11/15/22 1051 11/15/22 1100   BP: (!) 147/72   123/61   Pulse: 99   97   Resp:  16 16 18   Temp:       TempSrc:       SpO2:    93%         Physical Exam:  Chest: clear to auscultation bilaterally  Heart: RRR no murmur  Abdomen: soft, nontender, nondistended  Extremities: DTR normal Bilateral lower extremities Right: 2/4   Left: 2/4  Clonus: absent    Urine Output: 325 mL/hr; Clear urine    Labs:  Last Magnesium Level:   Lab Results   Component Value Date/Time    MG 5.3 11/15/2022 11:42 AM       BMP:    Recent Labs     22  2312      K 4.6      CO2 21   BUN 10   CREATININE 0.61   GLUCOSE 92       ASSESSMENT/PLAN  Vijay Luciano is a 28 y.o. female  admitted for IOL 2/2 PreE w/ SF (BP)   - Continue Magnesium Sulfate Treatment 2g/hr, off @ 24 hours PP   -  Mag levels q6hrs   - BPs now stable   - Patient denies any s/s PreE   - UOP adequate   - PreE labs wnl, P/C 1.86 ()   - Currently controlled on Procardia 30mg XL QD (started 11/15)   - Last IV anti-hypertensive: Hydralazine 10mg @ 0806   - Continue to monitor closely    Sid Langley DO  Ob/Gyn Resident  11/15/2022, 1:12 PM

## 2022-11-15 NOTE — PROGRESS NOTES
Obstetric/Gynecology Resident Interval Note    Patient noted to have severe range Bps requiring IV Hydralazine 10mg x2. Repeat BP now 128/72. Patient denies s/s PreE. Magnesium sulfate infusion continues. Will continue to monitor closely.     Keo Blas DO  OB/GYN Resident, PGY2  [facility]  11/15/2022, 8:43 AM

## 2022-11-15 NOTE — DISCHARGE SUMMARY
Obstetric Discharge Summary  [facility]    Patient Name: Orion Vigil  Patient : 1995  Primary Care Physician: No primary care provider on file. Admit Date: 2022    Principal Diagnosis: IUP at 39w6d, admitted for RR IOL     Her pregnancy has been complicated by:   Patient Active Problem List   Diagnosis    Lactose intolerance    Genital warts    Late prenatal care    History of prior pregnancy with IUGR     Noncompliant pregnant patient in third trimester    PreE w/ SF x2 (G1, G2)    PLTCS 11/15/22 M Apg 8/9 Wt 8#6    Postpartum care following  delivery       Infection Present?: No  Hospital Acquired: N/A    Surgical Operations & Procedures:  Analgesia: epidural  Delivery Type:  Delivery: See Labor and Delivery Summary   Laceration(s): N/A    Consultations: NICU and Anesthesia    Pertinent Findings & Procedures:   Orion Vigil is a 28 y.o. female  at 39w6d admitted for RR IOL; received cervidil x1, pitocin, SROM, FSE. Patient presented with elevated severe range blood pressure qualifying her as having a new diagnosis of severe gestational hypertension. Magnesium per protocol was started. PreE labs wnl, P/C was 1.86 qualifying her as PreE with SF. Patient required IV antihypertensives and started on Procardia 30 xl daily. The patient became complete and began to push. Category II tracing was noted. Fetus was noted to be 0 station with moderate decent. The patient stated she was unable to push. The decision was made to proceed with  section secondary to maternal request, maternal effort and category II tracing. The patient received Ancef, Azithro and TXA preoperatively. She delivered by primary low transverse  a Live Born infant on 22. Information for the patient's :  Arnetta Homans [[id number]]   male   Birth Weight: 8 lb 6.9 oz (3.825 kg)     Apgars: 8 at 1 minute and 9 at 5 minutes. Postpartum course: Her Mg Sulfate was continued for 24h postpartum. She received Keflex/Flagyl x48h postop and Lovenox for DVT prophylaxis. BP remained elevated and required additional antihypertensives. She was increased to Procardia 30XL BID. POD#1 Hgb: 7.5 She has required IV Labetalol 20 mg x2. She was given an additional dose of Procardia 30. POD#2 Hgb 8.3. BP remained elevated, she received an additional 30XL procardia and was increased to 90XL daily. Course of patient: complicated by newly diagnosed PreEclampsia with severe features     Discharge to: Home    Readmission planned: no     Recommendations on Discharge:     Medications:      Medication List        START taking these medications      ferrous sulfate 325 (65 Fe) MG tablet  Commonly known as: IRON 325  Take 1 tablet by mouth 2 times daily     ibuprofen 600 MG tablet  Commonly known as: ADVIL;MOTRIN  Take 1 tablet by mouth every 6 hours as needed for Pain     NIFEdipine 90 MG extended release tablet  Commonly known as: PROCARDIA XL  Take 1 tablet by mouth daily     ondansetron 4 MG disintegrating tablet  Commonly known as: Zofran ODT  Take 1 tablet by mouth every 8 hours as needed for Nausea or Vomiting     oxyCODONE 5 MG immediate release tablet  Commonly known as: Roxicodone  Take 1 tablet by mouth every 6 hours as needed for Pain for up to 5 days. Intended supply: 5 days.  Take lowest dose possible to manage pain     sennosides-docusate sodium 8.6-50 MG tablet  Commonly known as: SENOKOT-S  Take 1 tablet by mouth daily     simethicone 80 MG chewable tablet  Commonly known as: MYLICON  Take 1 tablet by mouth 4 times daily as needed for Flatulence (gas pain)            CONTINUE taking these medications      Prenatal Gummies 0.18-25 MG Chew               Where to Get Your Medications        These medications were sent to [facility], [address] [place] [address]  [address], [address] [place] [address]      Phone: [phone]   ferrous sulfate 325 (65 Fe) MG tablet  ibuprofen 600 MG tablet  NIFEdipine 90 MG extended release tablet  ondansetron 4 MG disintegrating tablet  oxyCODONE 5 MG immediate release tablet  sennosides-docusate sodium 8.6-50 MG tablet  simethicone 80 MG chewable tablet           Activity: pelvic rest x 6 weeks, no driving while on narcotics, no lifting greater than 15 lbs  Diet: regular diet  Follow up: 1 week for  postpartum visit and Prevena removal    Condition on discharge: stable    Discharge date: 11/18/22    Nelson Thompson DO  Ob/Gyn Resident    Comments:  Home care and follow-up care were reviewed. Pelvic rest, and birth control were reviewed. Signs and symptoms of mastitis and post partum depression were reviewed. The patient is to notify her physician if any of these occur. The patient was counseled on secondary smoke risks and the increased risk of sudden infant death syndrome and respiratory problems to her baby with exposure. She was counseled on various alternate recommendations to decrease the exposure to secondary smoke to her children.

## 2022-11-15 NOTE — ANESTHESIA PRE PROCEDURE
Department of Anesthesiology  Preprocedure Note       Name:  Lisa Post   Age:  28 y.o.  :  1995                                          MRN:  [id number]         Date:  11/15/2022      Surgeon: * No surgeons listed *    Procedure: * No procedures listed *    Medications prior to admission:   Prior to Admission medications    Medication Sig Start Date End Date Taking?  Authorizing Provider   Prenatal MV & Min w/FA-DHA (PRENATAL GUMMIES) 0.18-25 MG CHEW Take 1 tablet by mouth daily    Historical Provider, MD       Current medications:    Current Facility-Administered Medications   Medication Dose Route Frequency Provider Last Rate Last Admin    NIFEdipine (PROCARDIA XL) extended release tablet 30 mg  30 mg Oral Daily Martha Sella, DO   30 mg at 11/15/22 0312    famotidine (PEPCID) 20 mg in sodium chloride (PF) 0.9 % 10 mL injection  20 mg IntraVENous BID PRN Fabi Parkers, DO        lactated ringers bolus  250 mL IntraVENous Once Fabi Parkers, .9 mL/hr at 11/15/22 0913 250 mL at 11/15/22 0913    oxytocin (PITOCIN) 30 units in 500 mL infusion  1-20 lynn-units/min IntraVENous Continuous Trang Boleratz, DO        lactated ringers infusion   IntraVENous Continuous Martha Sella, DO 75 mL/hr at 11/15/22 8590 Rate Verify at 11/15/22 1404    lactated ringers bolus  500 mL IntraVENous PRN Nikkie Deleon MD        Or    lactated ringers bolus  1,000 mL IntraVENous PRN Nikkie Deleon MD        sodium chloride flush 0.9 % injection 5-40 mL  5-40 mL IntraVENous 2 times per day Nikkie Deleon MD        sodium chloride flush 0.9 % injection 5-40 mL  5-40 mL IntraVENous PRN Nikkie Deleon MD   10 mL at 11/15/22 0840    0.9 % sodium chloride infusion  25 mL IntraVENous PRN Nikkie Deleon MD        ondansetron [facility]) injection 4 mg  4 mg IntraVENous Q6H PRN Nikkie Deleon MD   4 mg at 11/15/22 0852    diphenhydrAMINE (BENADRYL) tablet 25 mg  25 mg Oral Q4H PRN Rhea Aw Dwayne Bradley MD   25 mg at 11/15/22 0348    acetaminophen (TYLENOL) tablet 1,000 mg  1,000 mg Oral Q6H PRN Isael Up MD   1,000 mg at 11/15/22 0348    benzocaine-menthol (DERMOPLAST) 20-0.5 % spray   Topical PRN Isael Up MD        sodium chloride flush 0.9 % injection 5-40 mL  5-40 mL IntraVENous 2 times per day Jp Remedies, DO        sodium chloride flush 0.9 % injection 5-40 mL  5-40 mL IntraVENous PRN Jp Remedies, DO        0.9 % sodium chloride infusion   IntraVENous PRN Jp Remedies, DO        magnesium sulfate (74596 mg/500mL infusion)  2,000 mg/hr IntraVENous Continuous Jp Remedies, DO 50 mL/hr at 11/15/22 0917 2,000 mg/hr at 11/15/22 1203    calcium gluconate 10 % injection 1,000 mg  1,000 mg IntraVENous PRN Jp Remedies, DO           Allergies:  No Known Allergies    Problem List:    Patient Active Problem List   Diagnosis Code    Lactose intolerance E73.9    Genital warts A63.0    Late prenatal care O09.30    History of prior pregnancy with IUGR  Z80.59    Noncompliant pregnant patient in third trimester O09.893, Z91.199    44 weeks gestation of pregnancy Z3A.39    PreE w/ SF x2 (G1, G2) O14.10       Past Medical History:        Diagnosis Date    Hypertension     hx of gHTN; preeclampsia       Past Surgical History:        Procedure Laterality Date    COLPOSCOPY         Social History:    Social History     Tobacco Use    Smoking status: Former    Smokeless tobacco: Never   Substance Use Topics    Alcohol use: Not Currently                                Counseling given: Not Answered      Vital Signs (Current):   Vitals:    11/15/22 0920 11/15/22 0934 11/15/22 0946 11/15/22 0951   BP: (!) 144/89 (!) 167/96 138/78 (!) 143/70   Pulse: 97 97 98 98   Resp: 18 16 18 16   Temp:       TempSrc:       SpO2: 94%                                                 BP Readings from Last 3 Encounters:   11/15/22 (!) 143/70   22 138/86   10/14/22 129/77 NPO Status:                                                                                 BMI:   Wt Readings from Last 3 Encounters:   11/14/22 204 lb (92.5 kg)   10/14/22 189 lb (85.7 kg)   08/24/22 175 lb (79.4 kg)     There is no height or weight on file to calculate BMI.    CBC:   Lab Results   Component Value Date/Time    WBC 9.5 11/14/2022 11:12 PM    RBC 3.90 11/14/2022 11:12 PM    HGB 9.4 11/14/2022 11:12 PM    HCT 29.9 11/14/2022 11:12 PM    MCV 76.7 11/14/2022 11:12 PM    RDW 17.2 11/14/2022 11:12 PM     11/14/2022 11:12 PM       CMP:   Lab Results   Component Value Date/Time     11/14/2022 11:12 PM    K 4.6 11/14/2022 11:12 PM     11/14/2022 11:12 PM    CO2 21 11/14/2022 11:12 PM    BUN 10 11/14/2022 11:12 PM    CREATININE 0.61 11/14/2022 11:12 PM    LABGLOM >60 11/14/2022 11:12 PM    GLUCOSE 92 11/14/2022 11:12 PM    PROT 5.9 11/14/2022 11:12 PM    CALCIUM 9.2 11/14/2022 11:12 PM    BILITOT 0.3 11/14/2022 11:12 PM    ALKPHOS 650 11/14/2022 11:12 PM    AST 32 11/14/2022 11:12 PM    ALT 7 11/14/2022 11:12 PM       POC Tests: No results for input(s): POCGLU, POCNA, POCK, POCCL, POCBUN, POCHEMO, POCHCT in the last 72 hours.     Coags: No results found for: PROTIME, INR, APTT    HCG (If Applicable):   Lab Results   Component Value Date    HCGQUANT 48,222 (H) 05/24/2022        ABGs: No results found for: PHART, PO2ART, TLY9CGU, FTI0QZW, BEART, Q6MRFQEZ     Type & Screen (If Applicable):  No results found for: LABABO, LABRH    Drug/Infectious Status (If Applicable):  No results found for: HIV, HEPCAB    COVID-19 Screening (If Applicable): No results found for: COVID19        Anesthesia Evaluation  Patient summary reviewed and Nursing notes reviewed no history of anesthetic complications:   Airway: Mallampati: II  TM distance: >3 FB   Neck ROM: full  Mouth opening: > = 3 FB   Dental:          Pulmonary:                              Cardiovascular:    (+) hypertension:,         Rhythm: regular                      Neuro/Psych:               GI/Hepatic/Renal:             Endo/Other:                     Abdominal:             Vascular: Other Findings:           Anesthesia Plan      epidural     ASA 2     (Plts 326)        Anesthetic plan and risks discussed with patient. Plan discussed with CRNA.     Attending anesthesiologist reviewed and agrees with Preprocedure content                BISMARK Galan - SHARON   11/15/2022

## 2022-11-15 NOTE — PROGRESS NOTES
Late entry note:  Patients cervix was checked and she was found to be complete and 0 station. Pt was feeling increased vaginal pressure. Pt started to push. It was difficult to obtain FHTs. ISL x 3 was attempted and was not functioning. Sono was brought in anf FHTs were 110-120s. FHTs were dropping to 90s with contractions. Pt was ineffectively pushing and was complaining of being exhausted and could not push. Since fetus was in OP presentation and mom unable to push. Mom requested primary . Pt was counseled on risks/ benefits/ alternative options and wished to proceed. Consent obtained. Anesthesia notified.  NICU was present in room ans notified

## 2022-11-15 NOTE — L&D DELIVERY NOTE
Mother's Information      Labor Events     Labor?: No  Cervical Ripening:   Now               Suzie Vera [[id number]]      Labor Events     Labor?: No   Steroids?: None  Cervical Ripening Date/Time:     Cervical Ripening Type: Cervidil  Rupture Date/Time:  11:45:00   Rupture Type: SROM  Fluid Color: Clear  Fluid Odor: None  Fluid Volume: Scant  Induction: Cervidil  Labor Complications: Fetal Intolerance       Anesthesia    Method: Epidural       Start Pushing      Labor onset date/time:   Now     Dilation complete date/time: 11/15/22 15:37:00 EST Now     Start pushing date/time: 11/15/2022 15:57:00   Decision date/time (emergent ):           Delivery ()      Delivery Date/Time:  11/15/22 16:54:00   Delivery Type: , Low Transverse    Details:  Trial of Labor?: Yes    Categorization: Primary    Priority: Non-scheduled   Indications for : Fetal Intolerance of Labor   Skin Incision Type: Pfannenstiel     Uterine Incision: Low Transverse             Palmer Presentation    _: Occiput  _: Posterior       Shoulder Dystocia    Shoulder Dystocia Present?: No  Add Second Maneuver  Add Third Maneuver  Add Fourth Maneuver  Add Fifth Maneuver  Add Sixth Maneuver  Add Seventh Maneuver  Add Eighth Maneuver  Add Ninth Maneuver       Assisted Delivery Details    Forceps Attempted?: No  Vacuum Extractor Attempted?: No       Document Additional Attempt         Document Additional Attempt                 Cord    Vessels: 3 Vessels  Cord Clamped Date/Time: 11/15/2022 16:55:00  Cord Blood Disposition: Lab  Gases Sent?: Yes       Placenta    Date/Time: 11/15/2022 16:56:00  Removal: Manual Removal  Appearance: Intact  Disposition: Lab       Lacerations    Episiotomy: None  Perineal Lacerations: None  Other Lacerations: no non-perineal laceration       Vaginal Counts        Sponges Needles Instruments   Initial Counts      Final Counts      If the count is incorrect due to Intentionally Retained Foreign Object (IRFO) add the IRFO LDA in Lines/Drains. Add LDA: Link to [facility]       Blood Loss  Mother: Milad Supa #[id number]     Start of Mother's Information      Delivery Blood Loss  11/15/22 1636 - 11/15/22 1731      None                 End of Mother's Information  Mother: Milad Supa #[id number]                Delivery Providers    Delivering clinician: Rhonda Villasenor,      Provider Role    Rhonda Villasenor,  Obstetrician    Khushi Rush, RN Primary Nurse    Erma Kim, RN Primary  Nurse    Chata Gonzalez, RN NICU Nurse     Neonatologist     Anesthesiologist     Nurse Anesthetist    Dario Obando, APRN - CNP Nurse Practitioner     Midwife     Nursery Nurse    Ana Nascimento, RN Primary Nurse               Assessment    Living Status: Living  Delivery Location Comment: OR #2     Apgar Scoring Key:    0 1 2    Skin Color: Blue or pale Acrocyanotic Completely pink    Heart Rate: Absent <100 bpm >100 bpm    Reflex Irritability: No response Grimace Cry or active withdrawal    Muscle Tone: Limp Some flexion Active motion    Respiratory Effort: Absent Weak cry; hypoventilation Good, crying                      Skin Color:   Heart Rate:   Reflex Irritability:   Muscle Tone:   Respiratory Effort:    Total:            1 Minute:    0    2    2    2    2    8        Apgar 1 total from OB History    5 Minute:    1    2    2    2    2    9        Apgar 5 total from OB History    10 Minute:              15 Minute:              20 Minute:                        Apgars Assigned By: PER NICU TEAM              Resuscitation    Method: Bulb Suction, Room Air, Stimulation, Suctioning              Measurements      Birth Weight: 3825 g Birth Length: 0.502 m              Title      Skin to Skin Initiation Date/Time:       Skin to Skin End Date/Time:       Reason Skin to Skin Not Initiated:  Acuity

## 2022-11-16 LAB
ABSOLUTE EOS #: 0 K/UL (ref 0–0.4)
ABSOLUTE IMMATURE GRANULOCYTE: 0 K/UL (ref 0–0.3)
ABSOLUTE LYMPH #: 3.26 K/UL (ref 1–4.8)
ABSOLUTE MONO #: 2.33 K/UL (ref 0.1–0.8)
BASOPHILS # BLD: 0 % (ref 0–2)
BASOPHILS ABSOLUTE: 0 K/UL (ref 0–0.2)
EOSINOPHILS RELATIVE PERCENT: 0 % (ref 1–4)
HCT VFR BLD CALC: 24.4 % (ref 36.3–47.1)
HEMOGLOBIN: 7.5 G/DL (ref 11.9–15.1)
IMMATURE GRANULOCYTES: 0 %
LYMPHOCYTES # BLD: 14 % (ref 24–44)
MAGNESIUM: 7.1 MG/DL (ref 1.6–2.6)
MCH RBC QN AUTO: 24.3 PG (ref 25.2–33.5)
MCHC RBC AUTO-ENTMCNC: 30.7 G/DL (ref 28.4–34.8)
MCV RBC AUTO: 79 FL (ref 82.6–102.9)
MONOCYTES # BLD: 10 % (ref 1–7)
MORPHOLOGY: ABNORMAL
NRBC AUTOMATED: 0.2 PER 100 WBC
NUCLEATED RED BLOOD CELLS: 1 PER 100 WBC
PDW BLD-RTO: 17.3 % (ref 11.8–14.4)
PLATELET # BLD: 303 K/UL (ref 138–453)
PMV BLD AUTO: 10 FL (ref 8.1–13.5)
RBC # BLD: 3.09 M/UL (ref 3.95–5.11)
SEG NEUTROPHILS: 76 % (ref 36–66)
SEGMENTED NEUTROPHILS ABSOLUTE COUNT: 17.71 K/UL (ref 1.8–7.7)
WBC # BLD: 23.3 K/UL (ref 3.5–11.3)

## 2022-11-16 PROCEDURE — 6370000000 HC RX 637 (ALT 250 FOR IP): Performed by: STUDENT IN AN ORGANIZED HEALTH CARE EDUCATION/TRAINING PROGRAM

## 2022-11-16 PROCEDURE — 2500000003 HC RX 250 WO HCPCS: Performed by: STUDENT IN AN ORGANIZED HEALTH CARE EDUCATION/TRAINING PROGRAM

## 2022-11-16 PROCEDURE — 36415 COLL VENOUS BLD VENIPUNCTURE: CPT

## 2022-11-16 PROCEDURE — 6360000002 HC RX W HCPCS: Performed by: STUDENT IN AN ORGANIZED HEALTH CARE EDUCATION/TRAINING PROGRAM

## 2022-11-16 PROCEDURE — 2580000003 HC RX 258: Performed by: STUDENT IN AN ORGANIZED HEALTH CARE EDUCATION/TRAINING PROGRAM

## 2022-11-16 PROCEDURE — 99024 POSTOP FOLLOW-UP VISIT: CPT | Performed by: OBSTETRICS & GYNECOLOGY

## 2022-11-16 PROCEDURE — 83735 ASSAY OF MAGNESIUM: CPT

## 2022-11-16 PROCEDURE — 1220000000 HC SEMI PRIVATE OB R&B

## 2022-11-16 PROCEDURE — 85025 COMPLETE CBC W/AUTO DIFF WBC: CPT

## 2022-11-16 RX ORDER — LABETALOL HYDROCHLORIDE 5 MG/ML
20 INJECTION, SOLUTION INTRAVENOUS ONCE
Status: COMPLETED | OUTPATIENT
Start: 2022-11-16 | End: 2022-11-16

## 2022-11-16 RX ORDER — NIFEDIPINE 30 MG/1
60 TABLET, EXTENDED RELEASE ORAL DAILY
Status: DISCONTINUED | OUTPATIENT
Start: 2022-11-17 | End: 2022-11-17

## 2022-11-16 RX ORDER — NIFEDIPINE 30 MG/1
30 TABLET, EXTENDED RELEASE ORAL ONCE
Status: COMPLETED | OUTPATIENT
Start: 2022-11-16 | End: 2022-11-16

## 2022-11-16 RX ADMIN — METRONIDAZOLE 500 MG: 500 TABLET, FILM COATED ORAL at 12:08

## 2022-11-16 RX ADMIN — MAGNESIUM SULFATE HEPTAHYDRATE 2000 MG/HR: 40 INJECTION, SOLUTION INTRAVENOUS at 09:16

## 2022-11-16 RX ADMIN — OXYCODONE 10 MG: 5 TABLET ORAL at 20:37

## 2022-11-16 RX ADMIN — POLYETHYLENE GLYCOL 3350 17 G: 17 POWDER, FOR SOLUTION ORAL at 09:17

## 2022-11-16 RX ADMIN — IBUPROFEN 600 MG: 600 TABLET, FILM COATED ORAL at 18:59

## 2022-11-16 RX ADMIN — SODIUM CHLORIDE, POTASSIUM CHLORIDE, SODIUM LACTATE AND CALCIUM CHLORIDE: 600; 310; 30; 20 INJECTION, SOLUTION INTRAVENOUS at 08:12

## 2022-11-16 RX ADMIN — CEPHALEXIN 500 MG: 500 CAPSULE ORAL at 20:00

## 2022-11-16 RX ADMIN — CEPHALEXIN 500 MG: 500 CAPSULE ORAL at 12:08

## 2022-11-16 RX ADMIN — KETOROLAC TROMETHAMINE 30 MG: 30 INJECTION, SOLUTION INTRAMUSCULAR at 05:56

## 2022-11-16 RX ADMIN — KETOROLAC TROMETHAMINE 30 MG: 30 INJECTION, SOLUTION INTRAMUSCULAR at 00:14

## 2022-11-16 RX ADMIN — KETOROLAC TROMETHAMINE 30 MG: 30 INJECTION, SOLUTION INTRAMUSCULAR at 12:10

## 2022-11-16 RX ADMIN — Medication 20 MG: at 14:18

## 2022-11-16 RX ADMIN — NIFEDIPINE 30 MG: 30 TABLET, FILM COATED, EXTENDED RELEASE ORAL at 09:17

## 2022-11-16 RX ADMIN — Medication 20 MG: at 10:32

## 2022-11-16 RX ADMIN — ENOXAPARIN SODIUM 40 MG: 100 INJECTION SUBCUTANEOUS at 10:17

## 2022-11-16 RX ADMIN — NIFEDIPINE 30 MG: 30 TABLET, FILM COATED, EXTENDED RELEASE ORAL at 14:29

## 2022-11-16 RX ADMIN — METRONIDAZOLE 500 MG: 500 TABLET, FILM COATED ORAL at 19:54

## 2022-11-16 ASSESSMENT — PAIN SCALES - GENERAL
PAINLEVEL_OUTOF10: 1
PAINLEVEL_OUTOF10: 6
PAINLEVEL_OUTOF10: 0
PAINLEVEL_OUTOF10: 2

## 2022-11-16 ASSESSMENT — PAIN DESCRIPTION - DESCRIPTORS: DESCRIPTORS: DISCOMFORT

## 2022-11-16 ASSESSMENT — PAIN DESCRIPTION - LOCATION: LOCATION: ABDOMEN;INCISION

## 2022-11-16 NOTE — ANESTHESIA POSTPROCEDURE EVALUATION
POST- ANESTHESIA EVALUATION       Pt Name: Orion Vigil  MRN: [id number]  [place]: 1995  Date of evaluation: 2022  Time:  7:44 AM      BP (!) 146/86   Pulse 83   Temp 98.4 °F (36.9 °C)   Resp 18   LMP 12/15/2021 (Approximate)   SpO2 (!) 86%   Breastfeeding Unknown      Consciousness Level  Awake  Cardiopulmonary Status  Stable  Pain Adequately Treated YES  Nausea / Vomiting  NO  Adequate Hydration  YES  Anesthesia Related Complications NONE      Electronically signed by Denis De Souza MD on 2022 at 7:44 AM       Department of Anesthesiology  Postprocedure Note    Patient: Orion Vigil  MRN: [id number]  [place]: 1995  Date of evaluation: 2022      Procedure Summary     Date: 11/15/22 Room / Location: [facility] OR [address] [place] [address]    Anesthesia Start: 1541 Anesthesia Stop: 1807    Procedures:        SECTION (Abdomen)      Labor Analgesia Diagnosis:       Non-reassuring fetal heart tones complicating pregnancy, antepartum      (38.6 primary c/s)    Surgeons: Clayton Sams DO Responsible Provider: Denis De Souza MD    Anesthesia Type: epidural ASA Status: 2          Anesthesia Type: No value filed.     Yolis Phase I: Yolis Score: 10    Yolis Phase II: Yolis Score: 10      Anesthesia Post Evaluation

## 2022-11-16 NOTE — PROGRESS NOTES
POST OPERATIVE DAY # 1    Bessy Woods is a 28 y.o. female   This patient was seen and examined today. Her pregnancy was complicated by:   Patient Active Problem List   Diagnosis    Lactose intolerance    Genital warts    Late prenatal care    History of prior pregnancy with IUGR     Noncompliant pregnant patient in third trimester    39 weeks gestation of pregnancy    PreE w/ SF x2 (G1, G2)    PLTCS 11/15/22 M Apg 8/9 Wt 8#6       Today she is doing well without any chief complaint. Her lochia is light. She denies chest pain, shortness of breath, headache, and lightheadedness. She is  bottle feeding and she denies any signs or symptoms of mastitis. She has not ambulated since her delivery. She is voiding with a castle. She currently denies S/S of postpartum depression. Flatus absent. Bowel movement absent. She is tolerating solids.     Vital Signs:  Vitals:    11/15/22 2330 22 0000 22 0030 22 0100   BP: (!) 142/100 (!) 154/86 129/84 126/74   Pulse: 88 90 81 85   Resp: 17 17 18    Temp:  98.4 °F (36.9 °C)     TempSrc:       SpO2: 95% 93% 93% 92%         Urine Input & Output last 24hrs:     Intake/Output Summary (Last 24 hours) at 2022 020  Last data filed at 11/15/2022 2029  Gross per 24 hour   Intake 7657.97 ml   Output 4297 ml   Net 3360.97 ml       Physical Exam:  General:  no apparent distress, alert, and cooperative  Neurologic:  alert, oriented, normal speech, no focal findings or movement disorder noted  Lungs:  No increased work of breathing, good air exchange, clear to auscultation bilaterally, no crackles or wheezing  Heart:  regular rate and rhythm    Abdomen: abdomen soft, non-distended, non-tender  Fundus: non-tender, normal size, firm, below umbilicus  Incision: clean, dry, and intact, Prevena in place  Extremities:  no calf tenderness, non edematous    Labs:  Lab Results   Component Value Date    WBC 9.5 2022    HGB 9.4 (L) 2022    HCT 29.9 (L) 11/14/2022    MCV 76.7 (L) 11/14/2022     11/14/2022       Assessment/Plan:  Sasha Navarro is a [id number] POD # 1 s/p PLTCS   - Doing well, VSS    - male infant in [address], circumcision desired   - Encourage ambulation and use of incentive spirometer   - D/C castle catheter and saline lock IV on POD #1    - CBC awaiting  Rh positive/Rubella immune  Bottle feeding   - S/s of mastitis reviewed  - Patient denies sx at this time  PreE with SF   - BP normotensive at this time   - Denies s/s of PreE   - Mg 4g>2g/h  off@ 11/16 1654    - Patient started on Procardia 30 mg XL BID   - Continue to monitor closely  Anemia   - Last Hgb 9.4  - CBC pending   - Denies s/s of anemia   - Continue to watch closely  Continue post-op care. Counseling Completed:  Secondary Smoke risks and Sudden Infant Death Syndrome were reviewed with recommendations. Infant sleeping, \"back to sleep\" and avoidance of co-sleeping recommendations were reviewed. Signs and Symptoms of Post Partum Depression were reviewed. The patient is to call if any occur. Signs and symptoms of Mastitis were reviewed. The patient is to call if any occur for follow up.   Discharge instructions including pelvic rest, incision care, 15 lb weight restriction, no driving with pain medicine and office follow-up were reviewed with patient     Attending Physician: Dr. Tresa Zacarias MD  Ob/Gyn Resident  11/16/2022, 2:04 AM

## 2022-11-16 NOTE — PROGRESS NOTES
Resident Interval Magnesium Sulfate Note    Stephani Tabor is a 28 y.o. female  POD# 1 s/p PLTCS  The patient is resting comfortably. She denies headache, visual changes, and abdominal pain in the right upper quadrant. She denies any shortness of breath or chest pain. She denies change in her extremities, regarding swelling.     Continuous Medications:    oxytocin Stopped (11/15/22 2132)    sodium chloride      lactated ringers 75 mL/hr at 11/15/22 1019    sodium chloride      magnesium sulfate 2,000 mg/hr (11/15/22 2207)       Vitals:    Vitals:    11/15/22 2330 22 0000 22 0030 22 0100   BP: (!) 142/100 (!) 154/86 129/84 126/74   Pulse: 88 90 81 85   Resp: 17 17 18    Temp:       TempSrc:       SpO2: 95% 93% 93% 92%         Physical Exam:  Chest: clear to auscultation bilaterally  Heart: RRR no murmur  Abdomen: soft, nontender, nondistended  Extremities: DTR normal Bilateral upper extremities Right: 2/4   Left: 2/4  Clonus: present    Urine Output: 100 mL/hr; Clear urine    Labs:  Last Magnesium Level:   Lab Results   Component Value Date/Time    MG 5.6 11/15/2022 07:58 PM       BMP:    Recent Labs     22  2312      K 4.6      CO2 21   BUN 10   CREATININE 0.61   GLUCOSE 92       ASSESSMENT/PLAN  Stephani Tabor is a 28 y.o. female  POD# 0 s/p PLTCS   - Continue Magnesium Sulfate Treatment 2g/hr, off @ 1654 on 22   - Mag levels q6hrs per provider   - BPs largely normotensive   - Patient denies any s/s PreE   - UOP adequate   - PreE labs wnl, P/C 1.86 ()   - Currently controlled on procardia 30 mg xl bid   - Last IV anti-hypertensive labetalol 20 @    - Continue to monitor closely    Donnis Leventhal, MD  Ob/Gyn Resident  2022, 1:42 AM

## 2022-11-16 NOTE — PROGRESS NOTES
Resident Interval Magnesium Sulfate Note    Denise Garcia is a 28 y.o. female  POD# 1 s/p PLTCS  The patient is resting comfortably. She denies headache, visual changes, and abdominal pain in the right upper quadrant. She denies any shortness of breath or chest pain. She denies change in her extremities, regarding swelling. Continuous Medications:    oxytocin Stopped (11/15/22 2132)    sodium chloride      lactated ringers 75 mL/hr at 11/15/22 1019    sodium chloride      magnesium sulfate 2,000 mg/hr (11/15/22 2207)       Vitals:    Vitals:    22 0200 22 0300 22 0400 22 0500   BP: (!) 155/94 118/74 133/89 (!) 147/80   Pulse: 92 85 82 92   Resp: 18 18 17 17   Temp:       TempSrc:       SpO2: 91% 96% 90% 93%         Physical Exam:  Chest: clear to auscultation bilaterally  Heart: RRR no murmur  Abdomen: soft, nontender, nondistended  Extremities: DTR normal Bilateral upper extremities Right: 2/4   Left: 2/4  Clonus: present    Urine Output: 60 mL/hr; Clear urine    Labs:  Last Magnesium Level:   Lab Results   Component Value Date/Time    MG 5.6 11/15/2022 07:58 PM       BMP:    Recent Labs     22  2312      K 4.6      CO2 21   BUN 10   CREATININE 0.61   GLUCOSE 92       ASSESSMENT/PLAN  Denise Garcia is a 28 y.o. female  POD# 0 s/p PLTCS   - Continue Magnesium Sulfate Treatment 2g/hr, off @ 1654 on 22   - Mag levels q6hrs per provider   - BPs largely normotensive   - Patient denies any s/s PreE   - UOP adequate   - PreE labs wnl, P/C 1.86 ()   - Currently controlled on procardia 30 mg xl bid   - Last IV anti-hypertensive labetalol 20 @    - Continue to monitor closely    Mynor Deluna MD  Ob/Gyn Resident  2022, 5:08 AM          Attending Physician Statement  I have discussed the care of Denise Garcia, including pertinent history and exam findings,  with the resident.  I have seen and examined the patient and the key elements of all parts of the encounter have been performed by me. I agree with the assessment, plan and orders as documented by the resident. [facility] Modifier)     Vitals:    11/16/22 1105 11/16/22 1110 11/16/22 1111 11/16/22 1121   BP:   (!) 142/94 (!) 148/105   Pulse:   87 91   Resp:   16 16   Temp:       TempSrc:       SpO2: 94% 94%       Recent Results (from the past 24 hour(s))   Magnesium    Collection Time: 11/15/22  7:58 PM   Result Value Ref Range    Magnesium 5.6 (HH) 1.6 - 2.6 mg/dL   CBC auto differential    Collection Time: 11/16/22 10:17 AM   Result Value Ref Range    WBC 23.3 (H) 3.5 - 11.3 k/uL    RBC 3.09 (L) 3.95 - 5.11 m/uL    Hemoglobin 7.5 (L) 11.9 - 15.1 g/dL    Hematocrit 24.4 (L) 36.3 - 47.1 %    MCV 79.0 (L) 82.6 - 102.9 fL    MCH 24.3 (L) 25.2 - 33.5 pg    MCHC 30.7 28.4 - 34.8 g/dL    RDW 17.3 (H) 11.8 - 14.4 %    Platelets 579 928 - 395 k/uL    MPV 10.0 8.1 - 13.5 fL    NRBC Automated 0.2 (H) 0.0 per 100 WBC    Seg Neutrophils PENDING %    Lymphocytes PENDING %    Monocytes PENDING %    Eosinophils % PENDING %    Basophils PENDING %    Immature Granulocytes PENDING 0 %    Segs Absolute PENDING k/uL    Absolute Lymph # PENDING k/uL    Absolute Mono # PENDING k/uL    Absolute Eos # PENDING k/uL    Basophils Absolute PENDING 0.0 - 0.2 k/uL    Absolute Immature Granulocyte PENDING 0.00 - 0.30 k/uL   Magnesium    Collection Time: 11/16/22 10:17 AM   Result Value Ref Range    Magnesium 7.1 (HH) 1.6 - 2.6 mg/dL     Proceed with current PP preeclampsia w/ SF treatment  POD#1 PLTCS, boy, circ desired - peds has not seen yet so will await peds evaluation  Continue watch mag levels and mag checks  Pt. Resting comfortably.     Mily Kohler, DO

## 2022-11-16 NOTE — PROGRESS NOTES
Resident Interval Magnesium Sulfate Note    Vijay Luciano is a 28 y.o. female  POD# 0 s/p PLTCS  The patient is resting comfortably. She denies headache, visual changes, and abdominal pain in the right upper quadrant. She denies any shortness of breath or chest pain. She denies change in her extremities, regarding swelling.     Continuous Medications:    oxytocin 87.3 lynn-units/min (11/15/22 180)    sodium chloride      lactated ringers 75 mL/hr at 11/15/22 1019    sodium chloride      magnesium sulfate 1,000 mg/hr (11/15/22 180)       Vitals:    Vitals:    11/15/22 2245 11/15/22 2300 11/15/22 2315 11/15/22 2330   BP: (!) 142/79 138/89 130/80 (!) 142/100   Pulse: 83 84 88 88   Resp: 17 17     Temp:       TempSrc:       SpO2: 97% 92% 91% 95%         Physical Exam:  Chest: clear to auscultation bilaterally  Heart: RRR no murmur  Abdomen: soft, nontender, nondistended  Extremities: DTR normal Bilateral upper extremities Right: 2/4   Left: 2/4  Clonus: present    Urine Output: 120 mL/hr; Clear urine    Labs:  Last Magnesium Level:   Lab Results   Component Value Date/Time    MG 5.6 11/15/2022 07:58 PM       BMP:    Recent Labs     222      K 4.6      CO2 21   BUN 10   CREATININE 0.61   GLUCOSE 92       ASSESSMENT/PLAN  Vijay Luciano is a 28 y.o. female  POD# 0 s/p PLTCS   - Continue Magnesium Sulfate Treatment 2g/hr, off @ 1654 on 22   - Mag levels q6hrs per provider   - BPs largely normotensive   - Patient denies any s/s PreE   - UOP adequate   - PreE labs wnl, P/C 1.86 ()   - Currently controlled on procardia 30 mg xl bid   - Last IV anti-hypertensive labetalol 20 @    - Continue to monitor closely    Kamron Morgan MD  Ob/Gyn Resident  11/15/2022, 9:23 PM

## 2022-11-16 NOTE — PROGRESS NOTES
Obstetric/Gynecology Resident Interval Note    Patient noted to have two severe range BP fifteen minutes apart. IV Labetalol 20mg ordered as well as an additional Procardia XL 30mg to be given at this time. Patient received her Procardia 30mg this AM at 0917. Will continue to monitor BP closely and plan to titrate Procardia to 60mg QD starting tomorrow AM.    Senior resident updated and in agreement with plan.     Quinton Suggs DO  OB/GYN Resident, PGY2  [facility]  11/16/2022, 2:38 PM

## 2022-11-16 NOTE — PROGRESS NOTES
Resident Interval Magnesium Sulfate Note    Bree Timmons is a 28 y.o. female  POD# 1 s/p PLTCS admitted for PreE w/ SF  The patient is resting comfortably. She denies headache, visual changes, and abdominal pain in the right upper quadrant. She denies any shortness of breath or chest pain. She denies change in her extremities, regarding swelling.     Continuous Medications:    oxytocin Stopped (11/15/22 2132)    sodium chloride      lactated ringers 75 mL/hr at 22 9473    sodium chloride      magnesium sulfate 2,000 mg/hr (22 0916)       Vitals:    Vitals:    22 1638 22 1700 22 1730 22 1800   BP: (!) 151/89 (!) 143/75 139/70 137/74   Pulse: (!) 104 (!) 107 (!) 114 (!) 111   Resp: 18      Temp:       TempSrc:       SpO2:             Physical Exam:  Chest: clear to auscultation bilaterally  Heart: RRR no murmur  Abdomen: soft, nontender, nondistended  Extremities: DTR normal Bilateral lower extremities Right: 2/4   Left: 2/4  Clonus: absent    Urine Output: 200 mL/hr; Clear urine    Labs:  Last Magnesium Level:   Lab Results   Component Value Date/Time    MG 7.1 2022 10:17 AM       BMP:    Recent Labs     22  2312      K 4.6      CO2 21   BUN 10   CREATININE 0.61   GLUCOSE 92       ASSESSMENT/PLAN  Bree Timmons is a 28 y.o. female  POD# 1 s/p PLTCS admitted for PreE w/ SF   - Continue Magnesium Sulfate Treatment 2g/hr, off @ 1654 on 22   -  Mag levels q6hrs    - BPs elevated but stable   - Patient denies any s/s PreE   - UOP adequate   - PreE labs wnl, P/C 1.86   - Currently on Procardia 30mg XL BID   - Last IV anti-hypertensive: Labetalol 20mg @ 1032   - Continue to monitor closely    Cheryl Varela DO  Ob/Gyn Resident  2022, 1:46 PM

## 2022-11-17 LAB
ABSOLUTE EOS #: 0 K/UL (ref 0–0.4)
ABSOLUTE IMMATURE GRANULOCYTE: 0 K/UL (ref 0–0.3)
ABSOLUTE LYMPH #: 3.84 K/UL (ref 1–4.8)
ABSOLUTE MONO #: 1.46 K/UL (ref 0.1–0.8)
BASOPHILS # BLD: 0 % (ref 0–2)
BASOPHILS ABSOLUTE: 0 K/UL (ref 0–0.2)
EOSINOPHILS RELATIVE PERCENT: 0 % (ref 1–4)
HCT VFR BLD CALC: 26.6 % (ref 36.3–47.1)
HEMOGLOBIN: 8.3 G/DL (ref 11.9–15.1)
IMMATURE GRANULOCYTES: 0 %
LYMPHOCYTES # BLD: 21 % (ref 24–44)
MCH RBC QN AUTO: 24.1 PG (ref 25.2–33.5)
MCHC RBC AUTO-ENTMCNC: 31.2 G/DL (ref 28.4–34.8)
MCV RBC AUTO: 77.3 FL (ref 82.6–102.9)
MONOCYTES # BLD: 8 % (ref 1–7)
MORPHOLOGY: ABNORMAL
NRBC AUTOMATED: 0.8 PER 100 WBC
PDW BLD-RTO: 17.7 % (ref 11.8–14.4)
PLATELET # BLD: 341 K/UL (ref 138–453)
PMV BLD AUTO: 9.7 FL (ref 8.1–13.5)
RBC # BLD: 3.44 M/UL (ref 3.95–5.11)
SEG NEUTROPHILS: 71 % (ref 36–66)
SEGMENTED NEUTROPHILS ABSOLUTE COUNT: 13 K/UL (ref 1.8–7.7)
SURGICAL PATHOLOGY REPORT: NORMAL
WBC # BLD: 18.3 K/UL (ref 3.5–11.3)

## 2022-11-17 PROCEDURE — 85025 COMPLETE CBC W/AUTO DIFF WBC: CPT

## 2022-11-17 PROCEDURE — 36415 COLL VENOUS BLD VENIPUNCTURE: CPT

## 2022-11-17 PROCEDURE — 6370000000 HC RX 637 (ALT 250 FOR IP): Performed by: STUDENT IN AN ORGANIZED HEALTH CARE EDUCATION/TRAINING PROGRAM

## 2022-11-17 PROCEDURE — 6360000002 HC RX W HCPCS: Performed by: STUDENT IN AN ORGANIZED HEALTH CARE EDUCATION/TRAINING PROGRAM

## 2022-11-17 PROCEDURE — 2580000003 HC RX 258: Performed by: STUDENT IN AN ORGANIZED HEALTH CARE EDUCATION/TRAINING PROGRAM

## 2022-11-17 PROCEDURE — 99024 POSTOP FOLLOW-UP VISIT: CPT | Performed by: OBSTETRICS & GYNECOLOGY

## 2022-11-17 PROCEDURE — 1220000000 HC SEMI PRIVATE OB R&B

## 2022-11-17 RX ORDER — NIFEDIPINE 30 MG/1
30 TABLET, EXTENDED RELEASE ORAL ONCE
Status: COMPLETED | OUTPATIENT
Start: 2022-11-17 | End: 2022-11-17

## 2022-11-17 RX ORDER — NIFEDIPINE 30 MG/1
90 TABLET, EXTENDED RELEASE ORAL DAILY
Status: DISCONTINUED | OUTPATIENT
Start: 2022-11-17 | End: 2022-11-18 | Stop reason: HOSPADM

## 2022-11-17 RX ADMIN — IBUPROFEN 600 MG: 600 TABLET, FILM COATED ORAL at 22:12

## 2022-11-17 RX ADMIN — POLYETHYLENE GLYCOL 3350 17 G: 17 POWDER, FOR SOLUTION ORAL at 09:17

## 2022-11-17 RX ADMIN — METRONIDAZOLE 500 MG: 500 TABLET, FILM COATED ORAL at 22:12

## 2022-11-17 RX ADMIN — CEPHALEXIN 500 MG: 500 CAPSULE ORAL at 05:35

## 2022-11-17 RX ADMIN — ENOXAPARIN SODIUM 40 MG: 100 INJECTION SUBCUTANEOUS at 09:12

## 2022-11-17 RX ADMIN — CEPHALEXIN 500 MG: 500 CAPSULE ORAL at 22:12

## 2022-11-17 RX ADMIN — IBUPROFEN 600 MG: 600 TABLET, FILM COATED ORAL at 02:35

## 2022-11-17 RX ADMIN — OXYCODONE 10 MG: 5 TABLET ORAL at 22:11

## 2022-11-17 RX ADMIN — OXYCODONE 10 MG: 5 TABLET ORAL at 04:49

## 2022-11-17 RX ADMIN — OXYCODONE 10 MG: 5 TABLET ORAL at 09:09

## 2022-11-17 RX ADMIN — METRONIDAZOLE 500 MG: 500 TABLET, FILM COATED ORAL at 13:15

## 2022-11-17 RX ADMIN — SODIUM CHLORIDE, PRESERVATIVE FREE 10 ML: 5 INJECTION INTRAVENOUS at 00:35

## 2022-11-17 RX ADMIN — OXYCODONE 10 MG: 5 TABLET ORAL at 17:57

## 2022-11-17 RX ADMIN — IBUPROFEN 600 MG: 600 TABLET, FILM COATED ORAL at 16:39

## 2022-11-17 RX ADMIN — METRONIDAZOLE 500 MG: 500 TABLET, FILM COATED ORAL at 05:35

## 2022-11-17 RX ADMIN — NIFEDIPINE 90 MG: 30 TABLET, FILM COATED, EXTENDED RELEASE ORAL at 09:12

## 2022-11-17 RX ADMIN — IBUPROFEN 600 MG: 600 TABLET, FILM COATED ORAL at 09:09

## 2022-11-17 RX ADMIN — OXYCODONE 10 MG: 5 TABLET ORAL at 13:15

## 2022-11-17 RX ADMIN — NIFEDIPINE 30 MG: 30 TABLET, FILM COATED, EXTENDED RELEASE ORAL at 02:34

## 2022-11-17 RX ADMIN — CEPHALEXIN 500 MG: 500 CAPSULE ORAL at 13:15

## 2022-11-17 ASSESSMENT — PAIN DESCRIPTION - DESCRIPTORS
DESCRIPTORS: SORE
DESCRIPTORS: SORE
DESCRIPTORS: ACHING
DESCRIPTORS: SORE
DESCRIPTORS: SORE

## 2022-11-17 ASSESSMENT — PAIN SCALES - GENERAL
PAINLEVEL_OUTOF10: 7
PAINLEVEL_OUTOF10: 5
PAINLEVEL_OUTOF10: 7

## 2022-11-17 ASSESSMENT — PAIN DESCRIPTION - LOCATION
LOCATION: ABDOMEN;INCISION
LOCATION: ABDOMEN
LOCATION: ABDOMEN;INCISION;HIP
LOCATION: ABDOMEN
LOCATION: ABDOMEN;INCISION

## 2022-11-17 ASSESSMENT — PAIN DESCRIPTION - ORIENTATION
ORIENTATION: MID;LOWER

## 2022-11-17 ASSESSMENT — PAIN - FUNCTIONAL ASSESSMENT: PAIN_FUNCTIONAL_ASSESSMENT: ACTIVITIES ARE NOT PREVENTED

## 2022-11-17 NOTE — CARE COORDINATION
Social Work     Sw reviewed medical record (current active problem list) and tox screens and found no concerns. Sw spoke with mom briefly to explain Sw role, inquire if any needs or concerns, and provide safe sleep education and discuss. Mom denied any needs or questions and informs baby has a safe sleep environment (Bassinet). Mom denied any current s/s of anxiety or depression and is aware to reach out to OB if any s/s occur after dc. Mom reports a good support system with fob and family, and denied any current questions or needs. Mom reports this is her 2nd child, she also has a 4 yr. Old son who is excited for baby. Mom states ped will be Dr Kartik Berman. Mom reports she has a car for transportation, but her car can be unreliable. Mom does have medicaid and is aware of utilizing medicaid cab if needed. Sw encouraged mom to reach out if any issues or concerns arise.

## 2022-11-17 NOTE — PROGRESS NOTES
Obstetrical Rounds:    POD#: 2  Hospital Day: 3  Procedure: primary  section    Date: 2022  Time: 9:44 AM        Patient Name: Warner Carrizales  Patient : 1995  Room/Bed: 2571/5070-61  Admission Date/Time: 2022 10:03 PM  MRN #: [id number]  [facility] #: [id number]        Attending Physician Statement  I have discussed the care of Warner Carrizales, including pertinent history and exam findings,  with the resident. I have reviewed their note in the electronic medical record. I have seen and examined the patient and the key elements of all parts of the encounter have been performed/reviewed by me . I agree with the assessment, plan and orders as documented by the resident. Pt seen & examined. Pt ambulating & urinating without difficulty. Pt states she is feeling much improved.  Plan to monitor BPs through the day today and probable discharge tomorrow    Vitals:    22 0414   BP: (!) 146/101   Pulse: 94   Resp: 18   Temp: 98.6 °F (37 °C)   SpO2: 97%       Admission on 2022   Component Date Value Ref Range Status    Expiration Date 2022,2359   Final    Arm Band Number 2022 [id number]   Final    ABO/Rh 2022 O POSITIVE   Final    Antibody Screen 2022 NEGATIVE   Final    Amphetamine Screen, Ur 11/15/2022 NEGATIVE  NEGATIVE Final    Comment:       (Positive cutoff 1000 ng/mL)                  Barbiturate Screen, Ur 11/15/2022 NEGATIVE  NEGATIVE Final    Comment:       (Positive cutoff 200 ng/mL)                  Benzodiazepine Screen, Urine 11/15/2022 NEGATIVE  NEGATIVE Final    Comment:       (Positive cutoff 200 ng/mL)                  Cocaine Metabolite, Urine 11/15/2022 NEGATIVE  NEGATIVE Final    Comment:       (Positive cutoff 300 ng/mL)                  Methadone Screen, Urine 11/15/2022 NEGATIVE  NEGATIVE Final    Comment:       (Positive cutoff 300 ng/mL)                  Opiates, Urine 11/15/2022 NEGATIVE  NEGATIVE Final    Comment:       (Positive cutoff 300 ng/mL)                  Phencyclidine, Urine 11/15/2022 NEGATIVE  NEGATIVE Final    Comment:       (Positive cutoff 25 ng/mL)                  Cannabinoid Scrn, Ur 11/15/2022 NEGATIVE  NEGATIVE Final    Comment:       (Positive cutoff 50 ng/mL)                  Oxycodone Screen, Ur 11/15/2022 NEGATIVE  NEGATIVE Final    Comment:       (Positive cutoff 100 ng/mL)                  Fentanyl, Ur 11/15/2022 NEGATIVE  NEGATIVE Final    Comment:       (Positive cutoff  5 ng/ml)            Test Information 11/15/2022 Assay provides medical screening only. The absence of expected drug(s) and/or metabolite(s) may indicate diluted or adulterated urine, limitations of testing or timing of collection. Final    Comment: Testing for legal purposes should be confirmed by another method. To request confirmation   of test result, please call the lab within 7 days of sample submission.       WBC 11/14/2022 9.5  3.5 - 11.3 k/uL Final    RBC 11/14/2022 3.90 (A)  3.95 - 5.11 m/uL Final    Hemoglobin 11/14/2022 9.4 (A)  11.9 - 15.1 g/dL Final    Hematocrit 11/14/2022 29.9 (A)  36.3 - 47.1 % Final    MCV 11/14/2022 76.7 (A)  82.6 - 102.9 fL Final    MCH 11/14/2022 24.1 (A)  25.2 - 33.5 pg Final    MCHC 11/14/2022 31.4  28.4 - 34.8 g/dL Final    RDW 11/14/2022 17.2 (A)  11.8 - 14.4 % Final    Platelets 51/86/6926 326  138 - 453 k/uL Final    MPV 11/14/2022 10.0  8.1 - 13.5 fL Final    NRBC Automated 11/14/2022 1.4 (A)  0.0 per 100 WBC Final    Seg Neutrophils 11/14/2022 64  36 - 65 % Final    Lymphocytes 11/14/2022 23 (A)  24 - 43 % Final    Monocytes 11/14/2022 10  3 - 12 % Final    Eosinophils % 11/14/2022 2  1 - 4 % Final    Basophils 11/14/2022 0  0 - 2 % Final    Immature Granulocytes 11/14/2022 1 (A)  0 % Final    Segs Absolute 11/14/2022 5.96  1.50 - 8.10 k/uL Final    Absolute Lymph # 11/14/2022 2.22  1.10 - 3.70 k/uL Final    Absolute Mono # 11/14/2022 0.98  0.10 - 1.20 k/uL Final    Absolute Eos # 11/14/2022 0.21 0.00 - 0.44 k/uL Final    Basophils Absolute 11/14/2022 0.03  0.00 - 0.20 k/uL Final    Absolute Immature Granulocyte 11/14/2022 0.09  0.00 - 0.30 k/uL Final    RBC Morphology 11/14/2022 ANISOCYTOSIS PRESENT   Final    MICROCYTOSIS PRESENT    Glucose 11/14/2022 92  70 - 99 mg/dL Final    BUN 11/14/2022 10  6 - 20 mg/dL Final    Creatinine 11/14/2022 0.61  0.50 - 0.90 mg/dL Final    Est, Glom Filt Rate 11/14/2022 >60  >60 mL/min/1.73m2 Final    Comment:       Effective Oct 3, 2022        These results are not intended for use in patients <25years of age. eGFR results are calculated without a race factor using the 2021 CKD-EPI equation. Careful clinical correlation is recommended, particularly when comparing to results   calculated using previous equations. The CKD-EPI equation is less accurate in patients with extremes of muscle mass, extra-renal   metabolism of creatine, excessive creatine ingestion, or following therapy that affects   renal tubular secretion. Calcium 11/14/2022 9.2  8.6 - 10.4 mg/dL Final    Sodium 11/14/2022 139  135 - 144 mmol/L Final    Potassium 11/14/2022 4.6  3.7 - 5.3 mmol/L Final    Chloride 11/14/2022 106  98 - 107 mmol/L Final    CO2 11/14/2022 21  20 - 31 mmol/L Final    Anion Gap 11/14/2022 12  9 - 17 mmol/L Final    Alkaline Phosphatase 11/14/2022 650 (A)  35 - 104 U/L Final    ALT 11/14/2022 7  5 - 33 U/L Final    AST 11/14/2022 32 (A)  <32 U/L Final    Total Bilirubin 11/14/2022 0.3  0.3 - 1.2 mg/dL Final    Total Protein 11/14/2022 5.9 (A)  6.4 - 8.3 g/dL Final    Albumin 11/14/2022 3.0 (A)  3.5 - 5.2 g/dL Final    Albumin/Globulin Ratio 11/14/2022 1.0  1.0 - 2.5 Final    Total Protein, Urine 11/15/2022 222  mg/dL Final    No normal range established.     Creatinine, Ur 11/15/2022 119.5  28.0 - 217.0 mg/dL Final    Urine Total Protein Creatinine Rat* 11/15/2022 1.86 (A)  0.00 - 0.20 Final    Magnesium 11/15/2022 4.4 (A)  1.6 - 2.6 mg/dL Final    Magnesium 11/15/2022 5.3 (A)  1.6 - 2.6 mg/dL Final    Magnesium 11/15/2022 5.6 (A)  1.6 - 2.6 mg/dL Final    Previous Alert Value Reported    WBC 11/16/2022 23.3 (A)  3.5 - 11.3 k/uL Final    RBC 11/16/2022 3.09 (A)  3.95 - 5.11 m/uL Final    Hemoglobin 11/16/2022 7.5 (A)  11.9 - 15.1 g/dL Final    Hematocrit 11/16/2022 24.4 (A)  36.3 - 47.1 % Final    MCV 11/16/2022 79.0 (A)  82.6 - 102.9 fL Final    MCH 11/16/2022 24.3 (A)  25.2 - 33.5 pg Final    MCHC 11/16/2022 30.7  28.4 - 34.8 g/dL Final    RDW 11/16/2022 17.3 (A)  11.8 - 14.4 % Final    Platelets 80/22/3668 303  138 - 453 k/uL Final    MPV 11/16/2022 10.0  8.1 - 13.5 fL Final    NRBC Automated 11/16/2022 0.2 (A)  0.0 per 100 WBC Final    Immature Granulocytes 11/16/2022 0  0 % Final    Seg Neutrophils 11/16/2022 76 (A)  36 - 66 % Final    Lymphocytes 11/16/2022 14 (A)  24 - 44 % Final    Monocytes 11/16/2022 10 (A)  1 - 7 % Final    Eosinophils % 11/16/2022 0 (A)  1 - 4 % Final    Basophils 11/16/2022 0  0 - 2 % Final    nRBC 11/16/2022 1 (A)  0 per 100 WBC Final    Absolute Immature Granulocyte 11/16/2022 0.00  0.00 - 0.30 k/uL Final    Segs Absolute 11/16/2022 17.71 (A)  1.8 - 7.7 k/uL Final    Absolute Lymph # 11/16/2022 3.26  1.0 - 4.8 k/uL Final    Absolute Mono # 11/16/2022 2.33 (A)  0.1 - 0.8 k/uL Final    Absolute Eos # 11/16/2022 0.00  0.0 - 0.4 k/uL Final    Basophils Absolute 11/16/2022 0.00  0.0 - 0.2 k/uL Final    Morphology 11/16/2022 ANISOCYTOSIS PRESENT   Final    Morphology 11/16/2022 MICROCYTOSIS PRESENT   Final    Morphology 11/16/2022 1+ POLYCHROMASIA   Final    Morphology 11/16/2022 1+ ACANTHOCYTES   Final    Magnesium 11/16/2022 7.1 (A)  1.6 - 2.6 mg/dL Final    Previous Alert Value Reported    WBC 11/17/2022 18.3 (A)  3.5 - 11.3 k/uL Final    RBC 11/17/2022 3.44 (A)  3.95 - 5.11 m/uL Final    Hemoglobin 11/17/2022 8.3 (A)  11.9 - 15.1 g/dL Final    Hematocrit 11/17/2022 26.6 (A)  36.3 - 47.1 % Final    MCV 11/17/2022 77.3 (A)  82.6 - 102.9 fL Final    MCH 2022 24.1 (A)  25.2 - 33.5 pg Final    MCHC 2022 31.2  28.4 - 34.8 g/dL Final    RDW 2022 17.7 (A)  11.8 - 14.4 % Final    Platelets  341  138 - 453 k/uL Final    MPV 2022 9.7  8.1 - 13.5 fL Final    NRBC Automated 2022 0.8 (A)  0.0 per 100 WBC Final    Immature Granulocytes 2022 0  0 % Final    Seg Neutrophils 2022 71 (A)  36 - 66 % Final    Lymphocytes 2022 21 (A)  24 - 44 % Final    Monocytes 2022 8 (A)  1 - 7 % Final    Eosinophils % 2022 0 (A)  1 - 4 % Final    Basophils 2022 0  0 - 2 % Final    Absolute Immature Granulocyte 2022 0.00  0.00 - 0.30 k/uL Final    Segs Absolute 2022 13.00 (A)  1.8 - 7.7 k/uL Final    Absolute Lymph # 2022 3.84  1.0 - 4.8 k/uL Final    Absolute Mono # 2022 1.46 (A)  0.1 - 0.8 k/uL Final    Absolute Eos # 2022 0.00  0.0 - 0.4 k/uL Final    Basophils Absolute 2022 0.00  0.0 - 0.2 k/uL Final    Morphology 2022 ANISOCYTOSIS PRESENT   Final    Morphology 2022 HYPOCHROMIA PRESENT   Final    Morphology 2022 [facility]   Final   ]      [facility]  [address]  [phone]      Discharge Instructions for  Birth     During a  section (), an incision is made in the abdomen and uterus (womb) to deliver the baby. The normal hospital stay is 2-4 days. Steps to Take   Home Care    For the first 1-2 weeks, ask for someone to help you at home. Let people help you. Take frequent rest breaks. For vaginal bleeding, use extra absorbent pads. Keep the incision area clean and dry. Ask your doctor about when it is safe to shower, bathe, or soak in water. Avoid heavy lifting for six weeks. Diet    After a  birth, you will start with a clear liquid diet. Examples include: Jell-o, broth, and ginger ale.  If you tolerate that, you can slowly go back to your regular diet. Stay away from anything greasy or spicy right after surgery. These types of foods can upset your stomach. Drink lots of fluids to prevent constipation. Physical Activity    Do not lift anything heavier than your baby. When shifting positions, use a pillow to support the area where the incisions were made. Get up slowly. This will help you to avoid feeling dizzy or light headed. Try to move around each day. Light physical activity will help with your recovery. Ask your doctor when you will be able to go back to work. Do not drive unless your doctor has given you permission to do so. Do not drive if you are taking prescription pain medicine. Ask your doctor when you will be able to resume sexual activity. If you have not done so already, talk to your doctor about birth control options. Medications    Your doctor may recommend pain medicine to ease discomfort. If you are taking medicines, follow these general guidelines:   Take your medicine as directed. Do not change the amount or the schedule. Do not stop taking them without talking to your doctor. Do not share them. Know what the results and side effects. Report them to your doctor. Some drugs can be dangerous when mixed. Talk to a doctor or pharmacist if you are taking more than one drug. This includes over-the-counter medicine and herb or dietary supplements. Plan ahead for refills so you don't run out. Lifestyle Changes    You and your doctor will plan lifestyle changes that will help you recover. To get encouragement and learn strategies, consider joining a support group for new mothers. Follow-up   Make a follow-up appointment as directed by your doctor.    Call Your Doctor If Any of the Following Occurs   After you leave the hospital, call your doctor if any of the following occurs:   Signs of infection, including fever and chills   Heavy vaginal bleeding   Foul-smelling vaginal discharge   Excessive bleeding, redness, swelling, increasing pain or discharge from the incision site   Nausea and/or vomiting that you cannot control with the medicines you were given after surgery, or which persist for more than two days after discharge from the hospital   Pain that you cannot control with the medicines you have been given   Swelling and/or pain in one or both legs   Cough, shortness of breath, or chest pain   Joint pain, fatigue, stiffness, rash, or other new symptoms   Become dizzy or faint   If you think you have an emergency,  CALL 911  . Home care, Restrictions,  Follow up Care, and birth control review completed    RTO 1-2 weeks    Secondary Smoke risks and Sudden Infant Death Syndrome were reviewed with recommendations. Cessation options discussed. Signs and Symptoms of Post Partum Depression were reviewed. The patient is to call if any occur. Signs and symptoms of Mastitis were reviewed. The patient is to call if any occur for follow up.       Attending's Name:  Electronically signed by Armaan Aguirre DO on 11/17/2022 at 9:44 AM

## 2022-11-17 NOTE — PROGRESS NOTES
POST OPERATIVE DAY # 2    Rosey Alpers is a 28 y.o. female   This patient was seen and examined today. Her pregnancy was complicated by:   Patient Active Problem List   Diagnosis    Lactose intolerance    Genital warts    Late prenatal care    History of prior pregnancy with IUGR     Noncompliant pregnant patient in third trimester    39 weeks gestation of pregnancy    PreE w/ SF x2 (G1, G2)    PLTCS 11/15/22 M Apg 8/9 Wt 8#6    Postpartum care following  delivery       Today she is doing well without any chief complaint. Her lochia is light. She denies chest pain, shortness of breath, headache, and lightheadedness. She is bottle feeding and she denies any signs or symptoms of mastitis. She is ambulating well. She is voiding without difficulty. She currently denies S/S of postpartum depression. Flatus present. Bowel movement absent. She is tolerating solids.     Vital Signs:  Vitals:    22 1800 22 1905 22 1920 22 2110   BP: 137/74 (!) 152/82  (!) 137/96   Pulse: (!) 111   93   Resp:    18   Temp:   98.4 °F (36.9 °C) 97.9 °F (36.6 °C)   TempSrc:   Oral Oral   SpO2:    96%         Urine Input & Output last 24hrs:     Intake/Output Summary (Last 24 hours) at 2022 0037  Last data filed at 2022 2125  Gross per 24 hour   Intake 1500 ml   Output 5450 ml   Net -3950 ml       Physical Exam:  General:  no apparent distress, alert, and cooperative  Neurologic:  alert, oriented, normal speech, no focal findings or movement disorder noted  Lungs:  No increased work of breathing, good air exchange, clear to auscultation bilaterally, no crackles or wheezing  Heart:  regular rate and rhythm    Abdomen: abdomen soft, non-distended, non-tender  Fundus: non-tender, normal size, firm, below umbilicus  Incision: clean, dry, and intact, prevena in place  Extremities:  no calf tenderness, non edematous    Labs:  Lab Results   Component Value Date    WBC 23.3 (H) 2022    HGB 7.5 (L) 11/16/2022    HCT 24.4 (L) 11/16/2022    MCV 79.0 (L) 11/16/2022     11/16/2022       Assessment/Plan:  Gm Melendez is a [id number] POD # 2 s/p PLTCS   - Doing well, VSS    - male infant in [address], circumcision done   - Encourage ambulation and use of incentive spirometer   - s/p castle catheter and saline lock IV on POD #1    - CBC completed (see below)  Rh positive/Rubella immune  Bottle feeding   - S/s of mastitis reviewed  - Patient denies sx at this time  PreE with SF   - s/p Mag   - BP elevated non severe    - Denies s/s of PreE   - Patient started on Procardia 30 mc XP BID   - Continue to monitor closely  Anemia  - Hgb 9.4 > 7.5  - Denies s/s of anemia  - CBC ordered for AM  Elevated WBC  - WBC 23.2  - Patient denies s/s of infection  - CBC ordered for AM  Continue post-op care. Counseling Completed:  Secondary Smoke risks and Sudden Infant Death Syndrome were reviewed with recommendations. Infant sleeping, \"back to sleep\" and avoidance of co-sleeping recommendations were reviewed. Signs and Symptoms of Post Partum Depression were reviewed. The patient is to call if any occur. Signs and symptoms of Mastitis were reviewed. The patient is to call if any occur for follow up.   Discharge instructions including pelvic rest, incision care, 15 lb weight restriction, no driving with pain medicine and office follow-up were reviewed with patient     Attending Physician: Dr. Marty Haddad MD  Ob/Gyn Resident  11/17/2022, 12:37 AM

## 2022-11-17 NOTE — CARE COORDINATION
CASE MANAGEMENT POST-PARTUM TRANSITIONAL CARE PLAN    39 weeks gestation of pregnancy [Z3A.39]    OB Provider: Dr. Janki Mcdonald met w/ Jorge Hernandez at bedside to discuss DCP. She is S/P CS on 11/15/22 @ 39w6d at 36    Writer verified name/address/phone number correct on facesheet. She states she lives with FOB/BF Estela Lundborg and her 4/12 yr old son. Jorge Hernandez verbalized no problems with transportation to and from doctors appointments or with paying for medications upon discharge home. [employer] ([place] [employer]) insurance correct. Writer notified Jorge Hernandez she has 30 days from date of birth to add  to insurance policy by calling [facility]. She verbalized understanding. Jorge Hernandez confirmed a safe place for infant to sleep at home. Infant name on BC: Keira Villa. Infant PCP: [facility]      DME: None  HOME CARE: None    Anticipate DC of couplet 22    Readmission Risk              Risk of Unplanned Readmission:  6

## 2022-11-18 VITALS
TEMPERATURE: 98.4 F | RESPIRATION RATE: 18 BRPM | DIASTOLIC BLOOD PRESSURE: 86 MMHG | HEART RATE: 111 BPM | OXYGEN SATURATION: 98 % | SYSTOLIC BLOOD PRESSURE: 132 MMHG

## 2022-11-18 PROBLEM — Z3A.39 39 WEEKS GESTATION OF PREGNANCY: Status: RESOLVED | Noted: 2022-11-14 | Resolved: 2022-11-18

## 2022-11-18 PROCEDURE — 6360000002 HC RX W HCPCS: Performed by: STUDENT IN AN ORGANIZED HEALTH CARE EDUCATION/TRAINING PROGRAM

## 2022-11-18 PROCEDURE — 2580000003 HC RX 258: Performed by: STUDENT IN AN ORGANIZED HEALTH CARE EDUCATION/TRAINING PROGRAM

## 2022-11-18 PROCEDURE — 6370000000 HC RX 637 (ALT 250 FOR IP): Performed by: STUDENT IN AN ORGANIZED HEALTH CARE EDUCATION/TRAINING PROGRAM

## 2022-11-18 PROCEDURE — 99024 POSTOP FOLLOW-UP VISIT: CPT | Performed by: OBSTETRICS & GYNECOLOGY

## 2022-11-18 RX ORDER — NIFEDIPINE 90 MG/1
90 TABLET, EXTENDED RELEASE ORAL DAILY
Qty: 30 TABLET | Refills: 5 | Status: SHIPPED | OUTPATIENT
Start: 2022-11-18

## 2022-11-18 RX ADMIN — OXYCODONE 10 MG: 5 TABLET ORAL at 16:40

## 2022-11-18 RX ADMIN — SODIUM CHLORIDE, PRESERVATIVE FREE 10 ML: 5 INJECTION INTRAVENOUS at 09:16

## 2022-11-18 RX ADMIN — OXYCODONE 5 MG: 5 TABLET ORAL at 12:33

## 2022-11-18 RX ADMIN — IBUPROFEN 600 MG: 600 TABLET, FILM COATED ORAL at 08:14

## 2022-11-18 RX ADMIN — ENOXAPARIN SODIUM 40 MG: 100 INJECTION SUBCUTANEOUS at 10:05

## 2022-11-18 RX ADMIN — SODIUM CHLORIDE, PRESERVATIVE FREE 10 ML: 5 INJECTION INTRAVENOUS at 10:08

## 2022-11-18 RX ADMIN — OXYCODONE 10 MG: 5 TABLET ORAL at 08:14

## 2022-11-18 RX ADMIN — IBUPROFEN 600 MG: 600 TABLET, FILM COATED ORAL at 16:40

## 2022-11-18 RX ADMIN — Medication 1 TABLET: at 09:14

## 2022-11-18 RX ADMIN — NIFEDIPINE 90 MG: 30 TABLET, FILM COATED, EXTENDED RELEASE ORAL at 09:14

## 2022-11-18 RX ADMIN — POLYETHYLENE GLYCOL 3350 17 G: 17 POWDER, FOR SOLUTION ORAL at 09:16

## 2022-11-18 ASSESSMENT — PAIN DESCRIPTION - LOCATION
LOCATION: ABDOMEN

## 2022-11-18 ASSESSMENT — PAIN DESCRIPTION - DESCRIPTORS
DESCRIPTORS: DISCOMFORT
DESCRIPTORS: CRAMPING;DISCOMFORT
DESCRIPTORS: ACHING;CRAMPING;DISCOMFORT
DESCRIPTORS: ACHING;CRAMPING;DISCOMFORT

## 2022-11-18 ASSESSMENT — PAIN - FUNCTIONAL ASSESSMENT
PAIN_FUNCTIONAL_ASSESSMENT: ACTIVITIES ARE NOT PREVENTED

## 2022-11-18 ASSESSMENT — PAIN SCALES - GENERAL
PAINLEVEL_OUTOF10: 7
PAINLEVEL_OUTOF10: 5
PAINLEVEL_OUTOF10: 4
PAINLEVEL_OUTOF10: 7

## 2022-11-18 ASSESSMENT — PAIN DESCRIPTION - PAIN TYPE: TYPE: SURGICAL PAIN

## 2022-11-18 NOTE — PROGRESS NOTES
CLINICAL PHARMACY NOTE: MEDS TO BEDS    Total # of Prescriptions Filled: 6   The following medications were delivered to the patient:  Ondansetron 4mg Odt  Ibuprofen 600mg tabs  Oxycodone hcl 5mg tbs  Senexon s 8.6-50mg tabs  Simethicone 80mg chew  Ferosul 325mg tabs    Additional Documentation:   Delivered to patient + 1 in room 737 on 11/18 at 10:28AM. No copay.

## 2022-11-18 NOTE — DISCHARGE INSTRUCTIONS
Follow-up with your OB doctor in 1 weeks or as specified by your physician. Please refer to A New Beginning-Your Personal Guide to Postpartum Care book provided in your room. Please take this book home with you to refer to. For Breastfeeding moms, you can contact our lactation specialist,  with any problems or questions you may have. Phone number [phone]. Feel free to leave voice mail and your call will be returned as soon as possible. DIET  Eat a well balanced diet focusing on foods high in fiber and protein. Drink 8-10 glasses of fluids daily, especially water. To avoid constipation you may take a mild stool softener as recommended by your doctor or midwife. If taking narcotics, they may constipate you. ACTIVITY  Gradually increase your activity. Resume exercise regimen only after advise by your doctor or midwife. Avoid lifting anything heavier than your baby or a gallon of milk for SIX weeks. Avoid driving 1 week for vaginal delivery and 2 weeks for  section, unless otherwise instructed by physician or if taking any pain medications. Rise slowly from a lying to sitting and then a standing position. Climb stairs carefully. Use caution when carrying your baby up and down the stairs. NO SEXUAL Activity for 6 weeks or until advised by your doctor; Nothing in vagina: intercourse, tampons, or douching. No swimming or hot tubs. Be prepared to discuss family planning at your follow-up OB visit. You may feel tired or have a lack of energy. You may continue your prenatal vitamin to replenish nutrients post delivery. Nap when baby naps to catch up on sleep. EMOTIONS  You may feel mills, sad, teary, & overwhelmed for the first 2 weeks postpartum. Contact your OB provider if you feel you may be showing signs of postpartum depression, or have thoughts of harming yourself or your infant.   If infant will not stop crying, contact another adult for help or place infant in their crib on their back and take a break. NEVER shake your infant. BLEEDING  Vaginal bleeding will decrease in amount over the next few weeks. You will notice that as your activity increases, your flow may increase. This is your body's way of telling you, you need take things easier and rest more often. Call your OB/ER if you are saturating one maxi pad in an hour & passing large clots. BREAST CARE  Take medications as recommended by your doctor or midwife for pain  If you develop a warm, red, tender area on your breast or develop a fever contact your lactation consultant. For breastfeeding moms:  If you become engorged, feeding may be more difficult or painful for 1-2 days. You may find it helpful to hand express some milk so that the infant can latch on more easily. While breastfeeding, continue to take your prenatal vitamins as directed by your doctor or midwife. Refer to the breastfeeding booklet in the  folder/binder for more information. For any questions or concerns contact a Lactation Consultant. Leave a message and your call will be returned. For NON-breastfeeding moms:  You may apply ice packs to your breasts over you bra for twenty minutes at a time for comfort. Cabbage leaves may be applied to breasts, replace when wilted. Avoid stimulation to your breasts, when showering allow the water to strike your back not your breasts. Do not express milk or your body will make more. Wear a good fitting bra until your milk dries, such as a sports bra. INCISIONAL CARE / ESTHELA CARE  Shower daily, cleansing incision and perineum with mild soap. After shower pat the incision area dry and allow the area open to air. To keep the incision dry, and if necessary, you may dry it with a hair dryer on the cool setting. If used, Steri-stipes should fall off by 2 weeks. If not please remove them when you are in the shower. Do not briskly pull at strips.   If used, Staples should be removed by the OB office by 1 week. If used/ordered, an abdominal binder may provide support for your incision. Use the manuela-bottle until bleeding stops each time you use the restroom. Cleanse your perineum from front to back. If used, stitches will dissolve in 4-6 weeks. You may use a sitz bath or soak in a clean tub with drain open and water running for comfort. Kegel exercises will help restore bladder control. SWELLING  Try to keep your legs elevated when you are sitting. When lying down keep your legs elevated. CALL THE DOCTOR WITH THESE HEALTH CONCERNS OR PROBLEMS  If you have a temp of 100.4 or more. If your bleeding has increased and you are saturating a pad in an hour. Your abdomen is tender to touch. You are passing blood clots bigger than the size of a lemon. If you are experiencing extreme weakness or dizziness. If you are having flu-like symptoms such as achy muscles or joints. There is a foul smell or a green color to your vaginal bleeding. If you have pain that cannot be relieved. You have persistent burning with urination or frequency. Call if you have concerns about your well-being. You are unable to sleep, eat, or are having thoughts of harming yourself or your baby. You have swelling, bleeding, drainage, foul odor, redness, or warmth in/around your incision or stitches. You have a red, warm, tender area in you calf.

## 2022-11-18 NOTE — PROGRESS NOTES
Obstetrical Rounds:    POD#: 3  Hospital Day:   Procedure: primary  section    Date: 2022  Time: 8:28 AM        Patient Name: [facility]  Patient : [id number]  Room/Bed: 0374/6899-92  Admission Date/Time: 2022 10:03 PM  MRN #: [id number]  CSN #: [id number]        Attending Physician Statement  I have discussed the care of [facility], including pertinent history and exam findings,  with the resident. I have reviewed their note in the electronic medical record. I have seen and examined the patient and the key elements of all parts of the encounter have been performed/reviewed by me . I agree with the assessment, plan and orders as documented by the resident. Pt seen & examined. Pt without c/c. Pt requesting discharge home. Plan discharge home if BPs stable.  Pt to follow up office 1 week    Vitals:    22 2340   BP: 115/71   Pulse: (!) 115   Resp:    Temp:    SpO2:        Admission on 2022   Component Date Value Ref Range Status    Expiration Date 2022,2359   Final    Arm Band Number 2022 [id number]   Final    ABO/Rh 2022 O POSITIVE   Final    Antibody Screen 2022 NEGATIVE   Final    Amphetamine Screen, Ur 11/15/2022 NEGATIVE  NEGATIVE Final    Comment:       (Positive cutoff 1000 ng/mL)                  Barbiturate Screen, Ur 11/15/2022 NEGATIVE  NEGATIVE Final    Comment:       (Positive cutoff 200 ng/mL)                  Benzodiazepine Screen, Urine 11/15/2022 NEGATIVE  NEGATIVE Final    Comment:       (Positive cutoff 200 ng/mL)                  Cocaine Metabolite, Urine 11/15/2022 NEGATIVE  NEGATIVE Final    Comment:       (Positive cutoff 300 ng/mL)                  Methadone Screen, Urine 11/15/2022 NEGATIVE  NEGATIVE Final    Comment:       (Positive cutoff 300 ng/mL)                  Opiates, Urine 11/15/2022 NEGATIVE  NEGATIVE Final    Comment:       (Positive cutoff 300 ng/mL)                  Phencyclidine, Urine 11/15/2022 NEGATIVE NEGATIVE Final    Comment:       (Positive cutoff 25 ng/mL)                  Cannabinoid Scrn, Ur 11/15/2022 NEGATIVE  NEGATIVE Final    Comment:       (Positive cutoff 50 ng/mL)                  Oxycodone Screen, Ur 11/15/2022 NEGATIVE  NEGATIVE Final    Comment:       (Positive cutoff 100 ng/mL)                  Fentanyl, Ur 11/15/2022 NEGATIVE  NEGATIVE Final    Comment:       (Positive cutoff  5 ng/ml)            Test Information 11/15/2022 Assay provides medical screening only. The absence of expected drug(s) and/or metabolite(s) may indicate diluted or adulterated urine, limitations of testing or timing of collection. Final    Comment: Testing for legal purposes should be confirmed by another method. To request confirmation   of test result, please call the lab within 7 days of sample submission.       WBC 11/14/2022 9.5  3.5 - 11.3 k/uL Final    RBC 11/14/2022 3.90 (A)  3.95 - 5.11 m/uL Final    Hemoglobin 11/14/2022 9.4 (A)  11.9 - 15.1 g/dL Final    Hematocrit 11/14/2022 29.9 (A)  36.3 - 47.1 % Final    MCV 11/14/2022 76.7 (A)  82.6 - 102.9 fL Final    MCH 11/14/2022 24.1 (A)  25.2 - 33.5 pg Final    MCHC 11/14/2022 31.4  28.4 - 34.8 g/dL Final    RDW 11/14/2022 17.2 (A)  11.8 - 14.4 % Final    Platelets 32/16/3963 326  138 - 453 k/uL Final    MPV 11/14/2022 10.0  8.1 - 13.5 fL Final    NRBC Automated 11/14/2022 1.4 (A)  0.0 per 100 WBC Final    Seg Neutrophils 11/14/2022 64  36 - 65 % Final    Lymphocytes 11/14/2022 23 (A)  24 - 43 % Final    Monocytes 11/14/2022 10  3 - 12 % Final    Eosinophils % 11/14/2022 2  1 - 4 % Final    Basophils 11/14/2022 0  0 - 2 % Final    Immature Granulocytes 11/14/2022 1 (A)  0 % Final    Segs Absolute 11/14/2022 5.96  1.50 - 8.10 k/uL Final    Absolute Lymph # 11/14/2022 2.22  1.10 - 3.70 k/uL Final    Absolute Mono # 11/14/2022 0.98  0.10 - 1.20 k/uL Final    Absolute Eos # 11/14/2022 0.21  0.00 - 0.44 k/uL Final    Basophils Absolute 11/14/2022 0.03  0.00 - 0.20 k/uL Final    Absolute Immature Granulocyte 11/14/2022 0.09  0.00 - 0.30 k/uL Final    RBC Morphology 11/14/2022 ANISOCYTOSIS PRESENT   Final    MICROCYTOSIS PRESENT    Glucose 11/14/2022 92  70 - 99 mg/dL Final    BUN 11/14/2022 10  6 - 20 mg/dL Final    Creatinine 11/14/2022 0.61  0.50 - 0.90 mg/dL Final    Est, Glom Filt Rate 11/14/2022 >60  >60 mL/min/1.73m2 Final    Comment:       Effective Oct 3, 2022        These results are not intended for use in patients <25years of age. eGFR results are calculated without a race factor using the 2021 CKD-EPI equation. Careful clinical correlation is recommended, particularly when comparing to results   calculated using previous equations. The CKD-EPI equation is less accurate in patients with extremes of muscle mass, extra-renal   metabolism of creatine, excessive creatine ingestion, or following therapy that affects   renal tubular secretion. Calcium 11/14/2022 9.2  8.6 - 10.4 mg/dL Final    Sodium 11/14/2022 139  135 - 144 mmol/L Final    Potassium 11/14/2022 4.6  3.7 - 5.3 mmol/L Final    Chloride 11/14/2022 106  98 - 107 mmol/L Final    CO2 11/14/2022 21  20 - 31 mmol/L Final    Anion Gap 11/14/2022 12  9 - 17 mmol/L Final    Alkaline Phosphatase 11/14/2022 650 (A)  35 - 104 U/L Final    ALT 11/14/2022 7  5 - 33 U/L Final    AST 11/14/2022 32 (A)  <32 U/L Final    Total Bilirubin 11/14/2022 0.3  0.3 - 1.2 mg/dL Final    Total Protein 11/14/2022 5.9 (A)  6.4 - 8.3 g/dL Final    Albumin 11/14/2022 3.0 (A)  3.5 - 5.2 g/dL Final    Albumin/Globulin Ratio 11/14/2022 1.0  1.0 - 2.5 Final    Total Protein, Urine 11/15/2022 222  mg/dL Final    No normal range established.     Creatinine, Ur 11/15/2022 119.5  28.0 - 217.0 mg/dL Final    Urine Total Protein Creatinine Rat* 11/15/2022 1.86 (A)  0.00 - 0.20 Final    Magnesium 11/15/2022 4.4 (A)  1.6 - 2.6 mg/dL Final    Magnesium 11/15/2022 5.3 (A)  1.6 - 2.6 mg/dL Final    Magnesium 11/15/2022 5.6 (A)  1.6 - 2.6 mg/dL Final    Previous Alert Value Reported    Surgical Pathology Report 11/15/2022    Final                    Value:-- Diagnosis --  PLACENTA, DELIVERED:  -THIRD TRIMESTER PLACENTA WITH THREE-VESSEL CORD WITH FOCAL MILD ACUTE  INFLAMMATION OF UMBILICAL VEIN. -MEMBRANES WITH FOCAL SQUAMOUS METAPLASIA OF AMNION SURFACE. -PLACENTAL DISC WITH FOCAL FRAGMENTED/TORN MATERNAL SURFACE  (APPROXIMATELY 5%) AND COMPLETENESS CANNOT BE DETERMINED. Melissa Tucker M.D.  **Electronically Signed Out**         niko/2022       Clinical Information  Pre-op Diagnosis:  26 Y/O  @ 44 W, 6 D, RR IOL, PRE-E W/SF    Operative Findings:  PLTCS, M, AP/9, WT: 8#, 6 OZ, PLACENTA, CORD,  MEMBRANES    Source of Specimen  A: PLACENTA, CORD, AND MEMBRANES    Gross Description  \"ADITYA WALKER, UNDESIGNATED\" Placenta with attached membranes and  umbilical cord. UMBILICAL CORD         Length:     12.0 cm       Diameter:     1.7 cm  True knots:     No  Number of vessels:     3  Spiraling:     Normal  Insertion into fetal surface:     Paracentral    MEMBRANES  Color:     Gray-tan, focally opacified  Meconium s                          taining:     No    FETAL SURFACE  Color:     Purple-gray, with a normal array of surface vessels  Subchorionic fibrin:     Patchy and marginal and involves  approximately 5% of the disc    MATERNAL SURFACE  Cotyledons:            -Fragmented/torn:     (Approximately 5%) and completeness cannot  be determined  -Focal lesions:     No    Placental size:     21.0 x 20.0 x 3.0 cm  Shape:     Ovoid  Weight:     513 grams  Number of cassettes:     3cs  tm      Microscopic Description       Umbilical cord: Focal mild acute inflammation of umbilical  vein. Membranes:     Focal squamous metaplasia of amnion surface.   Meconium staining:     No  Infarcts:     No  Intervillous thrombi:     No  Subchorionic fibrin:     Not significantly increased  Villous maturation:     Appropriate  Nucleated erythrocytes in     villous capillaries:     Not increased  Other:     Few microcalcifications      SURGICAL PATHOLOGY CONSULTATION       Patient Name: Derek Rosas: [id number]  Path Number:                           [id number]    [address] [place] [address].   [place], [address]  [phone]  Fax: [phone]      WBC 11/16/2022 23.3 (A)  3.5 - 11.3 k/uL Final    RBC 11/16/2022 3.09 (A)  3.95 - 5.11 m/uL Final    Hemoglobin 11/16/2022 7.5 (A)  11.9 - 15.1 g/dL Final    Hematocrit 11/16/2022 24.4 (A)  36.3 - 47.1 % Final    MCV 11/16/2022 79.0 (A)  82.6 - 102.9 fL Final    MCH 11/16/2022 24.3 (A)  25.2 - 33.5 pg Final    MCHC 11/16/2022 30.7  28.4 - 34.8 g/dL Final    RDW 11/16/2022 17.3 (A)  11.8 - 14.4 % Final    Platelets 94/37/4195 303  138 - 453 k/uL Final    MPV 11/16/2022 10.0  8.1 - 13.5 fL Final    NRBC Automated 11/16/2022 0.2 (A)  0.0 per 100 WBC Final    Immature Granulocytes 11/16/2022 0  0 % Final    Seg Neutrophils 11/16/2022 76 (A)  36 - 66 % Final    Lymphocytes 11/16/2022 14 (A)  24 - 44 % Final    Monocytes 11/16/2022 10 (A)  1 - 7 % Final    Eosinophils % 11/16/2022 0 (A)  1 - 4 % Final    Basophils 11/16/2022 0  0 - 2 % Final    nRBC 11/16/2022 1 (A)  0 per 100 WBC Final    Absolute Immature Granulocyte 11/16/2022 0.00  0.00 - 0.30 k/uL Final    Segs Absolute 11/16/2022 17.71 (A)  1.8 - 7.7 k/uL Final    Absolute Lymph # 11/16/2022 3.26  1.0 - 4.8 k/uL Final    Absolute Mono # 11/16/2022 2.33 (A)  0.1 - 0.8 k/uL Final    Absolute Eos # 11/16/2022 0.00  0.0 - 0.4 k/uL Final    Basophils Absolute 11/16/2022 0.00  0.0 - 0.2 k/uL Final    Morphology 11/16/2022 ANISOCYTOSIS PRESENT   Final    Morphology 11/16/2022 MICROCYTOSIS PRESENT   Final    Morphology 11/16/2022 1+ POLYCHROMASIA   Final    Morphology 11/16/2022 1+ ACANTHOCYTES   Final    Magnesium 11/16/2022 7.1 (A)  1.6 - 2.6 mg/dL Final    Previous Alert Value Reported    WBC 2022 18.3 (A)  3.5 - 11.3 k/uL Final    RBC 2022 3.44 (A)  3.95 - 5.11 m/uL Final    Hemoglobin 2022 8.3 (A)  11.9 - 15.1 g/dL Final    Hematocrit 2022 26.6 (A)  36.3 - 47.1 % Final    MCV 2022 77.3 (A)  82.6 - 102.9 fL Final    MCH 2022 24.1 (A)  25.2 - 33.5 pg Final    MCHC 2022 31.2  28.4 - 34.8 g/dL Final    RDW 2022 17.7 (A)  11.8 - 14.4 % Final    Platelets  341  138 - 453 k/uL Final    MPV 2022 9.7  8.1 - 13.5 fL Final    NRBC Automated 2022 0.8 (A)  0.0 per 100 WBC Final    Immature Granulocytes 2022 0  0 % Final    Seg Neutrophils 2022 71 (A)  36 - 66 % Final    Lymphocytes 2022 21 (A)  24 - 44 % Final    Monocytes 2022 8 (A)  1 - 7 % Final    Eosinophils % 2022 0 (A)  1 - 4 % Final    Basophils 2022 0  0 - 2 % Final    Absolute Immature Granulocyte 2022 0.00  0.00 - 0.30 k/uL Final    Segs Absolute 2022 13.00 (A)  1.8 - 7.7 k/uL Final    Absolute Lymph # 2022 3.84  1.0 - 4.8 k/uL Final    Absolute Mono # 2022 1.46 (A)  0.1 - 0.8 k/uL Final    Absolute Eos # 2022 0.00  0.0 - 0.4 k/uL Final    Basophils Absolute 2022 0.00  0.0 - 0.2 k/uL Final    Morphology 2022 ANISOCYTOSIS PRESENT   Final    Morphology 2022 HYPOCHROMIA PRESENT   Final    Morphology 2022 [facility] Final [facility] & Gynecology  [address], [address] [place] [address]  [phone]      Discharge Instructions for  Birth     During a  section (), an incision is made in the abdomen and uterus (womb) to deliver the baby. The normal hospital stay is 2-4 days. Steps to Take   Home Care    For the first 1-2 weeks, ask for someone to help you at home. Let people help you. Take frequent rest breaks. For vaginal bleeding, use extra absorbent pads. Keep the incision area clean and dry.     Ask your doctor about when it is safe to shower, bathe, or soak in water. Avoid heavy lifting for six weeks. Diet    After a  birth, you will start with a clear liquid diet. Examples include: Jell-o, broth, and ginger ale. If you tolerate that, you can slowly go back to your regular diet. Stay away from anything greasy or spicy right after surgery. These types of foods can upset your stomach. Drink lots of fluids to prevent constipation. Physical Activity    Do not lift anything heavier than your baby. When shifting positions, use a pillow to support the area where the incisions were made. Get up slowly. This will help you to avoid feeling dizzy or light headed. Try to move around each day. Light physical activity will help with your recovery. Ask your doctor when you will be able to go back to work. Do not drive unless your doctor has given you permission to do so. Do not drive if you are taking prescription pain medicine. Ask your doctor when you will be able to resume sexual activity. If you have not done so already, talk to your doctor about birth control options. Medications    Your doctor may recommend pain medicine to ease discomfort. If you are taking medicines, follow these general guidelines:   Take your medicine as directed. Do not change the amount or the schedule. Do not stop taking them without talking to your doctor. Do not share them. Know what the results and side effects. Report them to your doctor. Some drugs can be dangerous when mixed. Talk to a doctor or pharmacist if you are taking more than one drug. This includes over-the-counter medicine and herb or dietary supplements. Plan ahead for refills so you don't run out. Lifestyle Changes    You and your doctor will plan lifestyle changes that will help you recover. To get encouragement and learn strategies, consider joining a support group for new mothers.    Follow-up   Make a follow-up appointment as directed by your doctor. Call Your Doctor If Any of the Following Occurs   After you leave the hospital, call your doctor if any of the following occurs:   Signs of infection, including fever and chills   Heavy vaginal bleeding   Foul-smelling vaginal discharge   Excessive bleeding, redness, swelling, increasing pain or discharge from the incision site   Nausea and/or vomiting that you cannot control with the medicines you were given after surgery, or which persist for more than two days after discharge from the hospital   Pain that you cannot control with the medicines you have been given   Swelling and/or pain in one or both legs   Cough, shortness of breath, or chest pain   Joint pain, fatigue, stiffness, rash, or other new symptoms   Become dizzy or faint   If you think you have an emergency,  CALL 911  . Home care, Restrictions,  Follow up Care, and birth control review completed    RTO 1 weeks    Secondary Smoke risks and Sudden Infant Death Syndrome were reviewed with recommendations. Cessation options discussed. Signs and Symptoms of Post Partum Depression were reviewed. The patient is to call if any occur. Signs and symptoms of Mastitis were reviewed. The patient is to call if any occur for follow up.       Attending's Name:  Electronically signed by Shaheen Joseph DO on 11/18/2022 at 8:28 AM

## 2022-11-18 NOTE — PROGRESS NOTES
CLINICAL PHARMACY NOTE: MEDS TO BEDS    Total # of Prescriptions Filled: 1   The following medications were delivered to the patient:  Nifedipine er osm release 90mg tabs    Additional Documentation:   Delivvered to pt + 1 in room 737 on 11/18 at 11:36AM. No copay

## 2022-11-18 NOTE — PROGRESS NOTES
POST OPERATIVE DAY # 3    Felicia Liriano is a 28 y.o. female   This patient was seen and examined today. Her pregnancy was complicated by:   Patient Active Problem List   Diagnosis    Lactose intolerance    Genital warts    Late prenatal care    History of prior pregnancy with IUGR     Noncompliant pregnant patient in third trimester    39 weeks gestation of pregnancy    PreE w/ SF x2 (G1, G2)    PLTCS 11/15/22 M Apg 8/9 Wt 8#6    Postpartum care following  delivery       Today she is doing well without any chief complaint. Her lochia is light. She denies chest pain, shortness of breath, headache, and lightheadedness. She is bottle feeding and she denies any signs or symptoms of mastitis. She is ambulating well. She is voiding without difficulty. She currently denies S/S of postpartum depression. Flatus present. Bowel movement absent. She is tolerating solids.     Vital Signs:  Vitals:    22 1200 22 1639 22 2214 22 2340   BP: (!) 137/91 133/79 (!) 142/94 115/71   Pulse: 96 95 (!) 132 (!) 115   Resp: 18 18 16    Temp:  98.2 °F (36.8 °C) 98.4 °F (36.9 °C)    TempSrc:  Oral Oral    SpO2:   98%          Urine Input & Output last 24hrs:     Intake/Output Summary (Last 24 hours) at 2022 0511  Last data filed at 2022 0900  Gross per 24 hour   Intake 200 ml   Output 975 ml   Net -775 ml       Physical Exam:  General:  no apparent distress, alert, and cooperative  Neurologic:  alert, oriented, normal speech, no focal findings or movement disorder noted  Lungs:  No increased work of breathing, good air exchange, clear to auscultation bilaterally, no crackles or wheezing  Heart:  regular rate and rhythm    Abdomen: abdomen soft, non-distended, non-tender  Fundus: non-tender, normal size, firm, below umbilicus  Incision: clean, dry, and intact, Prevena in place  Extremities:  no calf tenderness, non edematous    Labs:  Lab Results   Component Value Date    WBC 18.3 (H) 11/17/2022    HGB 8.3 (L) 11/17/2022    HCT 26.6 (L) 11/17/2022    MCV 77.3 (L) 11/17/2022     11/17/2022       Assessment/Plan:  Laverne Vallejo is a [id number] POD # 3 s/p PLTCS   - Doing well,  VSS    - male infant in [address], circumcision done   - Encourage ambulation and use of incentive spirometer   - s/p castle catheter and saline lock IV on POD #1    - CBC completed (see below)  Rh positive/Rubella immune  Bottle feeding   - S/s of mastitis reviewed  - Patient denies sx at this time  PreE w SF    - s/p Mag              - BP elevated non severe                     - Denies s/s of PreE              - Patient started on Procardia 30 mc XP BID              - Continue to monitor closely  Anemia   - Hgb 9.4>7.5>8. 3   - Denies s/s of anemia   - Continue to monitor closely   Elevated WBC   - WBC 23.2>18.3   - Denies s/s of infection  Continue post-op care. BMI 39    Counseling Completed:  Secondary Smoke risks and Sudden Infant Death Syndrome were reviewed with recommendations. Infant sleeping, \"back to sleep\" and avoidance of co-sleeping recommendations were reviewed. Signs and Symptoms of Post Partum Depression were reviewed. The patient is to call if any occur. Signs and symptoms of Mastitis were reviewed. The patient is to call if any occur for follow up.   Discharge instructions including pelvic rest, incision care, 15 lb weight restriction, no driving with pain medicine and office follow-up were reviewed with patient     Attending Physician: Dr. Saturnino Alvarado MD  Ob/Gyn Resident  11/18/2022, 5:11 AM

## 2022-11-23 ENCOUNTER — HOSPITAL ENCOUNTER (EMERGENCY)
Age: 27
Discharge: HOME | End: 2022-11-23
Attending: EMERGENCY MEDICINE
Payer: MEDICARE

## 2022-11-23 ENCOUNTER — OFFICE VISIT (OUTPATIENT)
Dept: OBGYN CLINIC | Age: 27
End: 2022-11-23
Payer: MEDICARE

## 2022-11-23 VITALS
DIASTOLIC BLOOD PRESSURE: 92 MMHG | SYSTOLIC BLOOD PRESSURE: 144 MMHG | BODY MASS INDEX: 35.14 KG/M2 | WEIGHT: 179 LBS | RESPIRATION RATE: 18 BRPM | OXYGEN SATURATION: 97 % | HEIGHT: 60 IN | HEART RATE: 112 BPM | TEMPERATURE: 98.5 F

## 2022-11-23 VITALS
BODY MASS INDEX: 35.14 KG/M2 | HEIGHT: 60 IN | SYSTOLIC BLOOD PRESSURE: 116 MMHG | WEIGHT: 179 LBS | DIASTOLIC BLOOD PRESSURE: 70 MMHG

## 2022-11-23 DIAGNOSIS — Z51.89 VISIT FOR WOUND CHECK: Primary | ICD-10-CM

## 2022-11-23 PROBLEM — Z91.199 NONCOMPLIANT PREGNANT PATIENT IN THIRD TRIMESTER: Status: RESOLVED | Noted: 2022-11-14 | Resolved: 2022-11-23

## 2022-11-23 PROBLEM — O09.893 NONCOMPLIANT PREGNANT PATIENT IN THIRD TRIMESTER: Status: RESOLVED | Noted: 2022-11-14 | Resolved: 2022-11-23

## 2022-11-23 LAB
ABSOLUTE EOS #: 0.13 K/UL (ref 0–0.4)
ABSOLUTE IMMATURE GRANULOCYTE: 0.53 K/UL (ref 0–0.3)
ABSOLUTE LYMPH #: 2.93 K/UL (ref 1–4.8)
ABSOLUTE MONO #: 0.8 K/UL (ref 0.1–0.8)
ALBUMIN SERPL-MCNC: 3.8 G/DL (ref 3.5–5.2)
ALBUMIN/GLOBULIN RATIO: 1.2 (ref 1–2.5)
ALP BLD-CCNC: 247 U/L (ref 35–104)
ALT SERPL-CCNC: 18 U/L (ref 5–33)
ANION GAP SERPL CALCULATED.3IONS-SCNC: 10 MMOL/L (ref 9–17)
AST SERPL-CCNC: 23 U/L
BASOPHILS # BLD: 1 % (ref 0–2)
BASOPHILS ABSOLUTE: 0.13 K/UL (ref 0–0.2)
BILIRUB SERPL-MCNC: 0.3 MG/DL (ref 0.3–1.2)
BUN BLDV-MCNC: 14 MG/DL (ref 6–20)
CALCIUM SERPL-MCNC: 9.3 MG/DL (ref 8.6–10.4)
CHLORIDE BLD-SCNC: 99 MMOL/L (ref 98–107)
CO2: 25 MMOL/L (ref 20–31)
CREAT SERPL-MCNC: 0.59 MG/DL (ref 0.5–0.9)
EOSINOPHILS RELATIVE PERCENT: 1 % (ref 1–4)
GFR SERPL CREATININE-BSD FRML MDRD: >60 ML/MIN/1.73M2
GLUCOSE BLD-MCNC: 96 MG/DL (ref 70–99)
HCT VFR BLD CALC: 32.3 % (ref 36.3–47.1)
HEMOGLOBIN: 9.7 G/DL (ref 11.9–15.1)
IMMATURE GRANULOCYTES: 4 %
LYMPHOCYTES # BLD: 22 % (ref 24–44)
MCH RBC QN AUTO: 23.7 PG (ref 25.2–33.5)
MCHC RBC AUTO-ENTMCNC: 30 G/DL (ref 28.4–34.8)
MCV RBC AUTO: 78.8 FL (ref 82.6–102.9)
MONOCYTES # BLD: 6 % (ref 1–7)
MORPHOLOGY: ABNORMAL
NRBC AUTOMATED: 0.4 PER 100 WBC
PDW BLD-RTO: 19.3 % (ref 11.8–14.4)
PLATELET # BLD: 426 K/UL (ref 138–453)
PMV BLD AUTO: 9.4 FL (ref 8.1–13.5)
POTASSIUM SERPL-SCNC: 4.1 MMOL/L (ref 3.7–5.3)
RBC # BLD: 4.1 M/UL (ref 3.95–5.11)
SEG NEUTROPHILS: 66 % (ref 36–66)
SEGMENTED NEUTROPHILS ABSOLUTE COUNT: 8.78 K/UL (ref 1.8–7.7)
SODIUM BLD-SCNC: 134 MMOL/L (ref 135–144)
TOTAL PROTEIN: 6.9 G/DL (ref 6.4–8.3)
WBC # BLD: 13.3 K/UL (ref 3.5–11.3)

## 2022-11-23 PROCEDURE — G8417 CALC BMI ABV UP PARAM F/U: HCPCS | Performed by: NURSE PRACTITIONER

## 2022-11-23 PROCEDURE — 85025 COMPLETE CBC W/AUTO DIFF WBC: CPT

## 2022-11-23 PROCEDURE — 80053 COMPREHEN METABOLIC PANEL: CPT

## 2022-11-23 PROCEDURE — G8428 CUR MEDS NOT DOCUMENT: HCPCS | Performed by: NURSE PRACTITIONER

## 2022-11-23 PROCEDURE — 99283 EMERGENCY DEPT VISIT LOW MDM: CPT

## 2022-11-23 PROCEDURE — 1111F DSCHRG MED/CURRENT MED MERGE: CPT | Performed by: NURSE PRACTITIONER

## 2022-11-23 PROCEDURE — G8484 FLU IMMUNIZE NO ADMIN: HCPCS | Performed by: NURSE PRACTITIONER

## 2022-11-23 PROCEDURE — 1036F TOBACCO NON-USER: CPT | Performed by: NURSE PRACTITIONER

## 2022-11-23 RX ORDER — DOCUSATE SODIUM 100 MG/1
100 CAPSULE, LIQUID FILLED ORAL 2 TIMES DAILY PRN
Qty: 60 CAPSULE | Refills: 0 | Status: SHIPPED | OUTPATIENT
Start: 2022-11-23

## 2022-11-23 RX ORDER — CEPHALEXIN 500 MG/1
500 CAPSULE ORAL 2 TIMES DAILY
Qty: 14 CAPSULE | Refills: 0 | Status: SHIPPED | OUTPATIENT
Start: 2022-11-23 | End: 2022-11-30

## 2022-11-23 ASSESSMENT — ENCOUNTER SYMPTOMS
BACK PAIN: 0
COUGH: 0
DIARRHEA: 0
VOMITING: 0
NAUSEA: 0
SHORTNESS OF BREATH: 0
ABDOMINAL PAIN: 0
CONSTIPATION: 0

## 2022-11-23 ASSESSMENT — PAIN SCALES - GENERAL: PAINLEVEL_OUTOF10: 6

## 2022-11-23 ASSESSMENT — PAIN - FUNCTIONAL ASSESSMENT: PAIN_FUNCTIONAL_ASSESSMENT: 0-10

## 2022-11-23 NOTE — ED NOTES
The following labs were labeled with appropriate pt sticker and tubed to lab:     [] Blue     [x] Lavender   [] on ice  [x] Green/yellow  [] Green/black [] on ice  [] Mariela Postal  [] on ice  [] Yellow  [x] Red  [] Type/ Screen  [] ABG  [] VBG    [] COVID-19 swab    [] Rapid  [] PCR  [] Flu swab  [] Peds Viral Panel     [] Urine Sample  [] Pelvic Cultures  [] Blood Cultures  [] X 2  [] STREP Cultures         Kin Ruiz RN  11/23/22 1400

## 2022-11-23 NOTE — DISCHARGE INSTRUCTIONS
You were evaluated in the emergency department for wound check for your  wound. OB/GYN evaluated you at the bedside. Your lab work-up was unremarkable except for a slightly elevated white blood cell count which is consistent with recent surgery and childbirth. You did not have a fever or any other signs or symptoms of infection. You were given a prescription by OB/GYN for an antibiotic. Please only take this medication if you notice reddening around your wound or white drainage from your wound per OB/GYN recommendations. Please follow-up with OB/GYN at your previously scheduled appointment. Please follow-up with your primary care physician. Please return to the emergency department for any worsening symptoms including but not limited to fever, shortness of breath or chest pain, worsening abdominal pain, drainage from your wound, inability to have a bowel movement or urinate, blood in your stool or urine, worsening vaginal bleeding or discharge, inability to stand or walk.

## 2022-11-23 NOTE — ED TRIAGE NOTES
Pt comes to ED with c/o abdominal wound check. Pt states having  about ten days ago since baby was not in best position, getting discharged on Friday, having wound vac, and started noticing blood yesterday, had an OB appointment today but was referred to ED. Pt states some cramping, abdominal pain and numbness, having regular, menstruating and changing pads about four times daily but pads minimally saturated bowel movement hypertension after pregnancy. Pt denies chest pain, SOB, other surgeries, fever, bleeding disorders, headache, changed in vision or hearing, tingling.

## 2022-11-23 NOTE — ED NOTES
Pt resting on stretcher, attached to pulse ox and BP, RR even and non labored, call light within reach.       Storm Lindsay RN  11/23/22 7743

## 2022-11-23 NOTE — CONSULTS
negative dysuria, negative vaginal discharge, negative vaginal bleeding  Dermatological: negative rash  Hematologic: negative bruising  Immunologic/Lymphatic: negative recent illness, negative recent sick contact  Musculoskeletal: negative back pain, negative myalgias, negative arthralgias  Neurological:  negative dizziness, negative weakness  Behavior/Psych: negative depression, negative anxiety    OBSTETRIC HISTORY:   OB History    Para Term  AB Living   2 2 2 0 0 2   SAB IAB Ectopic Molar Multiple Live Births   0 0 0 0 0 2      # Outcome Date GA Lbr Wilber/2nd Weight Sex Delivery Anes PTL Lv   2 Term 11/15/22 39w6d / 01:17 8 lb 6.9 oz (3.825 kg) M CS-LTranv EPI N JEANNETTE      Complications: Fetal Intolerance, Patient-requested procedure, Maternal exhaustion, delivered, current hospitalization      Name: Hugo Fields: 8  Apgar5: 9   1 Term 18 38w0d / 01:00 5 lb 13.7 oz (2.655 kg) M Vag-Spont  N JEANNETTE      Name: Roland Quintanilla: 9  Apgar5: 9     PAST MEDICAL HISTORY:   has a past medical history of Hypertension. PAST SURGICAL HISTORY:   has a past surgical history that includes Colposcopy and  section (N/A, 11/15/2022). ALLERGIES:  Allergies as of 2022    (No Known Allergies)     MEDICATIONS:  No current facility-administered medications for this encounter.      Current Outpatient Medications   Medication Sig Dispense Refill    cephALEXin (KEFLEX) 500 MG capsule Take 1 capsule by mouth 2 times daily for 7 days Take only if experiencing purulent drainage from wound, redness over incision or fevers/chills at home 14 capsule 0    docusate sodium (COLACE) 100 MG capsule Take 1 capsule by mouth 2 times daily as needed for Constipation 60 capsule 0    NIFEdipine (PROCARDIA XL) 90 MG extended release tablet Take 1 tablet by mouth daily 30 tablet 5    ibuprofen (ADVIL;MOTRIN) 600 MG tablet Take 1 tablet by mouth every 6 hours as needed for Pain 40 tablet 0 erythema present, slow serosanginous drainage from right side of the incision  Extremities:  no calf tenderness, non edematous   Musculoskeletal: Gross strength equal and intact throughout, no gross abnormalities, range of motion normal in hips, knees, shoulders and spine  Psychiatric: Mood appropriate, normal affect   Rectal Exam: not indicated  Pelvic Exam: not indicated    LAB RESULTS:  Results for orders placed or performed during the hospital encounter of 11/23/22   CBC with Auto Differential   Result Value Ref Range    WBC 13.3 (H) 3.5 - 11.3 k/uL    RBC 4.10 3.95 - 5.11 m/uL    Hemoglobin 9.7 (L) 11.9 - 15.1 g/dL    Hematocrit 32.3 (L) 36.3 - 47.1 %    MCV 78.8 (L) 82.6 - 102.9 fL    MCH 23.7 (L) 25.2 - 33.5 pg    MCHC 30.0 28.4 - 34.8 g/dL    RDW 19.3 (H) 11.8 - 14.4 %    Platelets 755 847 - 812 k/uL    MPV 9.4 8.1 - 13.5 fL    NRBC Automated 0.4 (H) 0.0 per 100 WBC    Immature Granulocytes 4 (H) 0 %    Seg Neutrophils 66 36 - 66 %    Lymphocytes 22 (L) 24 - 44 %    Monocytes 6 1 - 7 %    Eosinophils % 1 1 - 4 %    Basophils 1 0 - 2 %    Absolute Immature Granulocyte 0.53 (H) 0.00 - 0.30 k/uL    Segs Absolute 8.78 (H) 1.8 - 7.7 k/uL    Absolute Lymph # 2.93 1.0 - 4.8 k/uL    Absolute Mono # 0.80 0.1 - 0.8 k/uL    Absolute Eos # 0.13 0.0 - 0.4 k/uL    Basophils Absolute 0.13 0.0 - 0.2 k/uL    Morphology ANISOCYTOSIS PRESENT     Morphology MICROCYTOSIS PRESENT     Morphology 1+ POLYCHROMASIA    Comprehensive Metabolic Panel   Result Value Ref Range    Glucose 96 70 - 99 mg/dL    BUN 14 6 - 20 mg/dL    Creatinine 0.59 0.50 - 0.90 mg/dL    Est, Glom Filt Rate >60 >60 mL/min/1.73m2    Calcium 9.3 8.6 - 10.4 mg/dL    Sodium 134 (L) 135 - 144 mmol/L    Potassium 4.1 3.7 - 5.3 mmol/L    Chloride 99 98 - 107 mmol/L    CO2 25 20 - 31 mmol/L    Anion Gap 10 9 - 17 mmol/L    Alkaline Phosphatase 247 (H) 35 - 104 U/L    ALT 18 5 - 33 U/L    AST 23 <32 U/L    Total Bilirubin 0.3 0.3 - 1.2 mg/dL    Total Protein 6.9 6.4 - 8.3 g/dL    Albumin 3.8 3.5 - 5.2 g/dL    Albumin/Globulin Ratio 1.2 1.0 - 2.5     DIAGNOSTICS:  No results found. ASSESSMENT & PLAN:    Ashwini Zafar is a 28 y.o. female  POD#8 s/p PLTCS presents to the ED for assessment of wound drainage that started today   - BPs intermittently elevated, but nonsevere range otherwise VSS    - Slight tachycardia but patient states that she is completely asymptomatic (denies CP, palpitations, SOB). - Afebrile   - CBC and CMP unremarkable; leukocytosis improving since surgery last week, previously 18.3 now 13.3, Hgb stable improved since  and is 9.7 today. Patient compliant with PO iron   - Patient reports drainage from the right side of the wound since Prevena dressing was removed today in the office by primary OB provider. She denies any abdominal trauma, fevers/chills or abdominal pain   - Physical exam shows that patient's incision is well approximately and healing appropriately. No wound breakdown or signs of infection present on exam. Slow serosanguinous fluid draining on right side of the incision. No masses palpated on abdominal exam   - Advised patient that incision is healing well with no evidence wound breakdown at this time. No signs of infection present. Suspect possible small seroma is present as patient experiencing slow oozing of serosanguinous drainage from the right side of the incision. No need for surgical intervention at this time. Will plan for expectant management. Patient encouraged to monitor incision for evidence of breakdown, redness, fevers/chills or purulent drainage. She was given return precautions. Rx for keflex 500 BID given to patient in the event that she were to develop any s/s post op wound infection. She was encouraged to follow up with her primary OB next week (22). Preeclampsia w/ SF   - BPs intermittently elevated but nonsevere   - S/p mag x24 hrs post partum.  No need to re-Mag at this time   - Denies any s/s PreE   - PreE labs wnl on admission   - Continue home dose of Procardia 90 XL QD and close outpatient f/u with primary OB provider    Tachycardia (pt asymptomatic)   - Patient tachycardic during admission at the time of CS and also today   - She denies any palpitations, CP or SOB and is asymptomatic at this time   - Recommend close outpatient follow up with PCP    Anemia   - Hgb 9.7 today which is improved from Hgb on    - Clinically asymptomatic at this time   - Continue PO iron supplementation  Patient Active Problem List    Diagnosis Date Noted    History of prior pregnancy with IUGR  2022     Priority: High    Late prenatal care 2022     Priority: High     Started at 19 weeks      Genital warts 2022     Priority: High    Postpartum care following  delivery 2022     Priority: Medium    PLTCS 11/15/22 M Apg 8/9 Wt 8#6 11/15/2022     Priority: Medium    PreE w/ SF x2 (G1, G2) 11/15/2022     G2 pregnancy  Hx PreE in G1      Lactose intolerance 10/31/2017     Plan discussed with Dr. Jan Chandler, who is agreeable.      Attending's Name: Dr. Newton Cowart MD  Ob/Gyn Resident   [address]  2022, 5:38 PM

## 2022-11-23 NOTE — ED PROVIDER NOTES
[facility]  ED  Emergency Department Encounter  EmergencyMedicine Resident     Pt Mohan Cardozo  MRN: [id number]  [place] 1995  Date of evaluation: 22  PCP:  No primary care provider on file. This patient was evaluated in the Emergency Department for symptoms described in the history of present illness. CHIEF COMPLAINT       Chief Complaint   Patient presents with    Wound Check       HISTORY OF PRESENT ILLNESS  (Location/Symptom, Timing/Onset, Context/Setting, Quality, Duration, Modifying Factors, Severity.)      Laverne Vallejo is a 28 y.o. female status post  8 days ago who presents with concern for wound bleeding/wound check. Patient states that she was at a routine checkup with her OB/GYN office today when her wound was evaluated. There was concern that her wound was bleeding so she was sent to the emergency department. Upon initial evaluation, patient resting comfortably, no acute distress. Endorsing slight pain over post op wound. Denies abdominal pain. Having normal bowel movements and urinating normally. Endorsing slight vaginal bleeding, changing pads every 5-6 hours. Pads are not soaked when being changed. Denies fever/chills, cough, n/v, chest pain, SOB, abdominal pain, change in bowel or bladder function, numbness/tingling, leg swelling, rash. PAST MEDICAL / SURGICAL / SOCIAL / FAMILY HISTORY      has a past medical history of Hypertension. has a past surgical history that includes Colposcopy and  section (N/A, 11/15/2022).     Social History     Socioeconomic History    Marital status: Single     Spouse name: Not on file    Number of children: Not on file    Years of education: Not on file    Highest education level: Not on file   Occupational History    Not on file   Tobacco Use    Smoking status: Former    Smokeless tobacco: Never   Vaping Use    Vaping Use: Never used   Substance and Sexual Activity    Alcohol use: Not Currently    Drug use: Never    Sexual activity: Yes     Partners: Male     Birth control/protection: Condom   Other Topics Concern    Not on file   Social History Narrative    Not on file     Social Determinants of Health     Financial Resource Strain: Not on file   Food Insecurity: Not on file   Transportation Needs: Not on file   Physical Activity: Not on file   Stress: Not on file   Social Connections: Not on file   Intimate Partner Violence: Not on file   Housing Stability: Not on file       Family History   Problem Relation Age of Onset    Asthma Mother     Hypertension Mother     Lupus Mother     Arthritis Mother     Anxiety Disorder Sister     Asthma Brother     Heart Surgery Maternal Grandmother        Allergies:    Patient has no known allergies. Home Medications:  Prior to Admission medications    Medication Sig Start Date End Date Taking?  Authorizing Provider   cephALEXin (KEFLEX) 500 MG capsule Take 1 capsule by mouth 2 times daily for 7 days Take only if experiencing purulent drainage from wound, redness over incision or fevers/chills at home 11/23/22 11/30/22 Yes Abida Marcus MD   docusate sodium (COLACE) 100 MG capsule Take 1 capsule by mouth 2 times daily as needed for Constipation 11/23/22   BISMARK Forrest NP   NIFEdipine (PROCARDIA XL) 90 MG extended release tablet Take 1 tablet by mouth daily 11/18/22   Edwar Garcia DO   ibuprofen (ADVIL;MOTRIN) 600 MG tablet Take 1 tablet by mouth every 6 hours as needed for Pain 11/15/22   Pavithra Ron, DO   sennosides-docusate sodium (SENOKOT-S) 8.6-50 MG tablet Take 1 tablet by mouth daily 11/15/22   Pavithra Ron, DO   ondansetron (ZOFRAN ODT) 4 MG disintegrating tablet Take 1 tablet by mouth every 8 hours as needed for Nausea or Vomiting 11/15/22   Pavithra Ron, DO   simethicone (MYLICON) 80 MG chewable tablet Take 1 tablet by mouth 4 times daily as needed for Flatulence (gas pain) 11/15/22   Pavithra Ron, DO   ferrous sulfate (IRON 325) 325 (65 Fe) MG tablet Take 1 tablet by mouth 2 times daily 11/15/22   Alexander Isabela, DO   Prenatal MV & Min w/FA-DHA (PRENATAL GUMMIES) 0.18-25 MG CHEW Take 1 tablet by mouth daily    Historical Provider, MD       REVIEW OF SYSTEMS    (2-9 systems for level 4, 10 or more for level 5)    Review of Systems   Constitutional:  Negative for chills and fever. HENT:  Negative for congestion. Eyes:  Negative for visual disturbance. Respiratory:  Negative for cough and shortness of breath. Cardiovascular:  Negative for chest pain. Gastrointestinal:  Negative for abdominal pain, constipation, diarrhea, nausea and vomiting. Genitourinary:  Negative for difficulty urinating, dysuria, vaginal bleeding and vaginal discharge. Musculoskeletal:  Negative for back pain. Skin:  Positive for wound. Neurological:  Negative for weakness, numbness and headaches. PHYSICAL EXAM   (up to 7 for level 4, 8 or more for level 5)    INITIAL VITALS:   BP (!) 144/92   Pulse (!) 112   Temp 98.5 °F (36.9 °C) (Oral)   Resp 18   Ht 5' (1.524 m)   Wt 179 lb (81.2 kg)   LMP 12/15/2021 (Approximate)   SpO2 97%   Breastfeeding No   BMI 34.96 kg/m²   I have reviewed the triage vital signs. Const: Well nourished, well developed, appears stated age, no acute distress  Eyes: PERRL, no conjunctival injection  HENT: NCAT, Neck supple without meningismus   CV: RRR, Warm, well-perfused extremities  RESP: CTAB, Unlabored respiratory effort  GI: soft, non-tender, non-distended, no masses.  incision horizontally over suprapubic region, slightly firm to palpation, no fluctuance or masses, non-tender to palpation, slight serosanguinous discharge, no purulence, no signs of dehiscence. MSK: No gross deformities appreciated  Skin: Warm, dry. No rashes  Neuro: Alert, CNs II-XII grossly intact. Sensation and motor function of extremities grossly intact. Psych: Appropriate mood and affect.       DIFFERENTIAL  DIAGNOSIS   DDX:  Wound check, infection, abscess    Initial MDM:  27 yo F 8 days post op planned  presents from outpatient OBGYN office with concerns for bleeding from  wound. Wound has some serosanguinous discharge. No purulence, erythema, edema, ecchymosis. Non-tender to palpation. Patient evaluated at bedside by OBGYN during the initial encounter. Wound was not able to be probed and showed signs of no dehiscence. Will await OBGYN recommendations. Will reevaluate.      PLAN (LABS / IMAGING / EKG):  Orders Placed This Encounter   Procedures    CBC with Auto Differential    Comprehensive Metabolic Panel    Inpatient consult to Obstetrics / Gynecology       MEDICATIONS ORDERED:  Orders Placed This Encounter   Medications    cephALEXin (KEFLEX) 500 MG capsule     Sig: Take 1 capsule by mouth 2 times daily for 7 days Take only if experiencing purulent drainage from wound, redness over incision or fevers/chills at home     Dispense:  14 capsule     Refill:  0         DIAGNOSTIC RESULTS / EMERGENCY DEPARTMENT COURSE / MDM   LAB RESULTS:  Results for orders placed or performed during the hospital encounter of 22   CBC with Auto Differential   Result Value Ref Range    WBC 13.3 (H) 3.5 - 11.3 k/uL    RBC 4.10 3.95 - 5.11 m/uL    Hemoglobin 9.7 (L) 11.9 - 15.1 g/dL    Hematocrit 32.3 (L) 36.3 - 47.1 %    MCV 78.8 (L) 82.6 - 102.9 fL    MCH 23.7 (L) 25.2 - 33.5 pg    MCHC 30.0 28.4 - 34.8 g/dL    RDW 19.3 (H) 11.8 - 14.4 %    Platelets 317 027 - 488 k/uL    MPV 9.4 8.1 - 13.5 fL    NRBC Automated 0.4 (H) 0.0 per 100 WBC    Immature Granulocytes 4 (H) 0 %    Seg Neutrophils 66 36 - 66 %    Lymphocytes 22 (L) 24 - 44 %    Monocytes 6 1 - 7 %    Eosinophils % 1 1 - 4 %    Basophils 1 0 - 2 %    Absolute Immature Granulocyte 0.53 (H) 0.00 - 0.30 k/uL    Segs Absolute 8.78 (H) 1.8 - 7.7 k/uL    Absolute Lymph # 2.93 1.0 - 4.8 k/uL    Absolute Mono # 0.80 0.1 - 0.8 k/uL    Absolute Eos # 0.13 0.0 - 0.4 k/uL    Basophils Absolute 0.13 0.0 - 0.2 k/uL    Morphology ANISOCYTOSIS PRESENT     Morphology MICROCYTOSIS PRESENT     Morphology 1+ POLYCHROMASIA    Comprehensive Metabolic Panel   Result Value Ref Range    Glucose 96 70 - 99 mg/dL    BUN 14 6 - 20 mg/dL    Creatinine 0.59 0.50 - 0.90 mg/dL    Est, Glom Filt Rate >60 >60 mL/min/1.73m2    Calcium 9.3 8.6 - 10.4 mg/dL    Sodium 134 (L) 135 - 144 mmol/L    Potassium 4.1 3.7 - 5.3 mmol/L    Chloride 99 98 - 107 mmol/L    CO2 25 20 - 31 mmol/L    Anion Gap 10 9 - 17 mmol/L    Alkaline Phosphatase 247 (H) 35 - 104 U/L    ALT 18 5 - 33 U/L    AST 23 <32 U/L    Total Bilirubin 0.3 0.3 - 1.2 mg/dL    Total Protein 6.9 6.4 - 8.3 g/dL    Albumin 3.8 3.5 - 5.2 g/dL    Albumin/Globulin Ratio 1.2 1.0 - 2.5       Mild leukocytosis of 13.3, consistent with post operative state, no signs or symptoms of infection, afebrile, leukocytosis consistent with post-operative state. CONSULTS:  IP CONSULT TO OB GYN    MDM/EMERGENCY DEPARTMENT COURSE:  ED Course as of 11/23/22 1727   Wed Nov 23, 2022   1703 Patient evaluated by OB/GYN. Patient okay for discharge per OB/GYN. Noted elevated heart rate but patient's heart rate was elevated during last admission. Upon reevaluation patient resting comfortably, no acute distress. Denies chest pain, shortness of breath, history of blood clots, pelvic pain. Patient adamant that she is only experiencing slight discomfort over wound. Encourage patient to follow-up with PCP for elevated heart rate. OB/GYN gave patient prescription for Keflex with instructions to take if she notices increasing redness, warmth, white discharge from wound. Will discharge patient. Patient understands and agrees the plan. [AR]      ED Course User Index  [AR] Wilfredo Alfonso, DO     CRITICAL CARE:  Please see Attending Note    FINAL IMPRESSION      1.  Visit for wound check          DISPOSITION / PLAN     DISPOSITION Decision To Discharge 11/23/2022 04:40:34 PM    PATIENT REFERRED TO:  [facility]  [address] [facility] [address]  [phone]  Go to   If symptoms worsen    [address]  [phone]  Schedule an appointment as soon as possible for a visit       DISCHARGE MEDICATIONS:  Discharge Medication List as of 11/23/2022  4:55 PM        START taking these medications    Details   cephALEXin (KEFLEX) 500 MG capsule Take 1 capsule by mouth 2 times daily for 7 days Take only if experiencing purulent drainage from wound, redness over incision or fevers/chills at home, Disp-14 capsule, R-0Normal      docusate sodium (COLACE) 100 MG capsule Take 1 capsule by mouth 2 times daily as needed for Constipation, Disp-60 capsule, R-0Normal             Abelino Luis DO  Emergency Medicine Resident, PGY 2    (Please note that portions of this note were completed with a voice recognition program.  Efforts were made to edit the dictations but occasionally words are mis-transcribed.)       Abelino Luis DO  Resident  11/23/22 1892

## 2022-11-23 NOTE — PROGRESS NOTES
Warner Carrizales  1:45 PM  22      Patient states yesterday she noted bleeding on her mingo bandage. States she was more active yesterday but not terribly active. Upon exam- mingo line was completely filled with bright red blood. Her bandage was completely soaked. While bandage was being removed bright red blood was pooling on the right side of her bandage. Monica MA in room to assist with removal. Incision was well approximated but bright red blood was leaking from multiple spots on incision after cleaning incision. Sent to ER for further evaluation       The patient was seen. She has no chief complaints today. She delivered by  section on 11/15/22. She is not breast feeding and there is not any signs or symptoms of mastitis. The patient completed the E.P.D.S. Evaluation form and scored NA. She does not have any signs or symptoms of post partum depression. She denies any suicidal thoughts with a plan, intent to harm others, and delusional ideas. Today her lochia is light she denies any dizziness or shortness of breath. Her pregnancy was complicated by:   Patient Active Problem List    Diagnosis Date Noted    History of prior pregnancy with IUGR  2022     Priority: High    Late prenatal care 2022     Priority: High     Overview Note:     Started at 19 weeks      Genital warts 2022     Priority: High    Postpartum care following  delivery 2022     Priority: Medium    PLTCS 11/15/22 M Apg 8/9 Wt 8#6 11/15/2022     Priority: Medium    PreE w/ SF x2 (G1, G2) 11/15/2022     Overview Note:     G2 pregnancy  Hx PreE in G1      Lactose intolerance 10/31/2017         She does admit to having good home support. Her bowels are regular and she denies any urinary tract symptomology.     OB History    Para Term  AB Living   2 2 2 0 0 2   SAB IAB Ectopic Molar Multiple Live Births   0 0 0 0 0 2           Blood pressure 116/70, height 5' (1.524 m), weight 179 lb (81.2 kg), last menstrual period 12/15/2021, not currently breastfeeding. Abdomen: Soft and non-tender; good bowel sounds; no guarding, rebound or rigidity; no CVA tenderness bilaterally. Incision: Clean, Intact without signs or symptoms of infection. Redness noted were bandage was removed from. Active bleeding noted from multiple areas on incision. Incision was well approximated. Above incision was firm to touch. No warmth noted     Extremities: No calf tenderness bilaterally. DTR 2/4 bilaterally. No edema. Assessment:   Diagnosis Orders   1. Postpartum care and examination          Chief Complaint   Patient presents with    Postpartum Care     EPDS Score of NA        Plan:  1. Return to the office in  3-4 weeks  2. Signs & Symptoms of mastitis reviewed; notify if occurs  3. Secondary smoke risks reviewed. Increased risks of respiratory problems, Sudden     infant death syndrome, and potential malignancies. 4. Abstinence  5. Family planning counseling and STD counseling completed  6. Continue with post operative restrictions  7. No lifting or [place]  8. Sent to ER for further evaluation of c/s incision d/t active bleeding  Rx colace to help prevent constipation     Patient was seen with total face to face time of 20 minutes. More than 50% of this visit was on counseling and education regarding her    Diagnosis Orders   1. Postpartum care and examination         and her options. She was also counseled on her preventative health maintenance recommendations and follow-up.

## 2022-11-23 NOTE — ED NOTES
Pt resting on stretcher, attached to pulse ox and BP, RR even and non labored, call light within reach.       Shoshana Sher RN  11/23/22 8120

## 2022-11-23 NOTE — ED PROVIDER NOTES
[facility]     Emergency Department     Faculty Attestation        I performed a history and physical examination of the patient and discussed management with the resident. I reviewed the residents note and agree with the documented findings and plan of care. Any areas of disagreement are noted on the chart. I was personally present for the key portions of any procedures. I have documented in the chart those procedures where I was not present during the key portions. I have reviewed the emergency nurses triage note. I agree with the chief complaint, past medical history, past surgical history, allergies, medications, social and family history as documented unless otherwise noted below. For mid-level providers such as nurse practitioners as well as physicians assistants:    I have personally seen and evaluated the patient. I find the patient's history and physical exam are consistent with NP/PA documentation. I agree with the care provided, treatment rendered, disposition, & follow-up plan. Additional findings are as noted. Vital Signs: BP (!) 156/97   Pulse (!) 125   Temp 98.1 °F (36.7 °C) (Oral)   Resp 18   Ht 5' (1.524 m)   Wt 179 lb (81.2 kg)   LMP 12/15/2021 (Approximate)   SpO2 99%   Breastfeeding No   BMI 34.96 kg/m²   PCP:  No primary care provider on file.     Pertinent Comments:         Critical Care  None          Lalo Celestin MD    Attending Emergency Medicine Physician            Ish Davis MD  11/23/22 2242

## 2022-11-23 NOTE — ED PROVIDER NOTES
[address] HANDOFF       Handoff taken on the following patient from prior Attending Physician:  Pt Name: Ashwini Zafar  PCP:  No primary care provider on file. Attestation  I was available and discussed any additional care issues that arose and coordinated the management plans with the resident(s) caring for the patient during my duty period. Any areas of disagreement with resident's documentation of care or procedures are noted on the chart. I was personally present for the key portions of any/all procedures during my duty period. I have documented in the chart those procedures where I was not present during the key portions.            Joshua Cherry MD  11/23/22 9781

## 2022-11-23 NOTE — ED NOTES
Pt resting on stretcher, attached to pulse ox and BP, RR even and non labored, call light within reach.      Janet Dickerson RN  11/23/22 [id number]

## 2023-07-25 NOTE — FLOWSHEET NOTE
1022at bedside. Epidural procedure explained, risks discussed. Pt verbalizes consent for epidural.   1025 patient positioned for epidural.1026 Time out completed. 1032 catheter placed. 1034 test dose given, HR 94.  Epidural catheter taped and secured per anesthesia [id number]. 1042 to low fowlers with left uterine displacement. 6mL loading dose given. 1045 pump initiated. Pt tolerated procedure well.
Assessment completed and charted patient offers no complaints at this time. Writer suggested that she help patient sit and dangle and try to ambulate to bathroom, patient states \"can we wait until 1000 I just was reallu dizzy last time and would like to get more sleep. \" Plan of care discussed with patient for day.
Castaneda catheter removed with difficulity.
Dr Bhargav Garcia notified of 2 severe's and residents.
I have reviewed all [facility] Post-Birth Warning Signs and essential teaching points for pulmonary embolism, cardiac disease, hypertensive disorders of pregnancy, obstetric hemorrhage, venous thromboembolism, infection, and postpartum depression with the patient . I have informed the patient on when to call their healthcare provider and when to call 911. I have discussed with the patient  the importance of scheduling a follow-up visit with their physician, nurse practitioner or midwife and provided them with correct contact information for appointment. I have provided the patient with a copy of the \"Save Your Life\" handout. The patient has acknowledged receiving and understanding this education with her signature.
Late entry    Writer holds EFM continuously (0836-1144)during intra-op, prior to abdominal CHG prep.  FHTs 120 bpm.
Patient admitted to room 737 from L&D via w/c. Oriented to room and surroundings. Plan of care reviewed. Verbalized understanding. Instructed on infant security and safe sleep practices. Preventing falls education provided . The following handouts given: A New Beginning: Your Guide to Postpartum Care, Rounding, gs Security System,Babies Cry A lot, Safe Sleep, Security and Visitation Guidelines. Call light placed within reach.
Patient states she is ready to get her catheter out, resident aware orders received.
Patient up to bathroom with 2 assists, manuela care completed and new pads placed. Patient tolerated well, patient asked if she could try getting up 1 more time before the catheter comes out.
Pt discharged to private residence with personal belongings. Pt transported via wheelchair with  and significant other.
Pt recovery complete. Ivory care done. Pt returned to room 707.
Rn to room to help pt dangle to side of bed. Pt dangled and then stood, pt stated she felt lightheaded. Pt returned to bed.
Second check BP was 165/95 and resident was phoned with notification.
Writer  updated Dr Moody Mail on severe range blood pressure of 167/110, orders received.
Writer bedside to help get patient up and patient states \"im just so tired can we wait awhile? \". Writer stated the importance of moving and trying to get out of bed patient states she understands she just want to take a nap.
Writer spoke with Dr Robe Ny regarding patient not wanting to get up and ambulate due to being \"tired\". Dr states she will be in to speak to patient. Writer also updated Dr on magnesium level and H/H from this morning, no new orders.
Yes

## (undated) DEVICE — KENDALL SCD EXPRESS SLEEVES, KNEE LENGTH, MEDIUM: Brand: KENDALL SCD

## (undated) DEVICE — GLOVE SURG SZ 7 THK118MIL BLK LTX FREE POLYISOPRENE BEAD CUF

## (undated) DEVICE — TOWEL SURG W16XL26IN WHT NONFENESTRATED ST 2 PER PK

## (undated) DEVICE — SUTURE VCRL SZ 2-0 L36IN ABSRB VLT L36MM CT-1 1/2 CIR J345H

## (undated) DEVICE — TRAY SPNL 24GA L4IN PENCAN PNCL PNT NDL 0.75% BIPIVCAIN W/